# Patient Record
Sex: FEMALE | Race: WHITE | NOT HISPANIC OR LATINO | Employment: OTHER | URBAN - METROPOLITAN AREA
[De-identification: names, ages, dates, MRNs, and addresses within clinical notes are randomized per-mention and may not be internally consistent; named-entity substitution may affect disease eponyms.]

---

## 2019-01-01 ENCOUNTER — APPOINTMENT (OUTPATIENT)
Dept: GENERAL RADIOLOGY | Facility: HOSPITAL | Age: 84
End: 2019-01-01

## 2019-01-01 ENCOUNTER — HOSPITAL ENCOUNTER (EMERGENCY)
Facility: HOSPITAL | Age: 84
Discharge: HOME | End: 2019-05-09
Attending: EMERGENCY MEDICINE | Admitting: EMERGENCY MEDICINE

## 2019-01-01 ENCOUNTER — HOSPITAL ENCOUNTER (INPATIENT)
Facility: HOSPITAL | Age: 84
LOS: 3 days | Discharge: HOME HEALTH | End: 2019-10-11
Attending: SPECIALIST | Admitting: SPECIALIST

## 2019-01-01 ENCOUNTER — TELEPHONE (OUTPATIENT)
Dept: FAMILY MEDICINE CLINIC | Facility: CLINIC | Age: 84
End: 2019-01-01

## 2019-01-01 ENCOUNTER — READMISSION MANAGEMENT (OUTPATIENT)
Dept: CALL CENTER | Facility: HOSPITAL | Age: 84
End: 2019-01-01

## 2019-01-01 ENCOUNTER — HOSPITAL ENCOUNTER (EMERGENCY)
Facility: HOSPITAL | Age: 84
Discharge: PSYCHIATRIC OR SUBSTANCE USE FACILITY | End: 2019-10-08
Attending: EMERGENCY MEDICINE | Admitting: EMERGENCY MEDICINE

## 2019-01-01 ENCOUNTER — APPOINTMENT (OUTPATIENT)
Dept: CT IMAGING | Facility: HOSPITAL | Age: 84
End: 2019-01-01

## 2019-01-01 ENCOUNTER — HOSPITAL ENCOUNTER (INPATIENT)
Facility: HOSPITAL | Age: 84
LOS: 4 days | Discharge: HOME HEALTH | End: 2019-10-06
Attending: INTERNAL MEDICINE | Admitting: INTERNAL MEDICINE

## 2019-01-01 ENCOUNTER — OFFICE VISIT (OUTPATIENT)
Dept: FAMILY MEDICINE CLINIC | Facility: CLINIC | Age: 84
End: 2019-01-01

## 2019-01-01 ENCOUNTER — TELEPHONE (OUTPATIENT)
Dept: OTHER | Facility: OTHER | Age: 84
End: 2019-01-01

## 2019-01-01 ENCOUNTER — APPOINTMENT (OUTPATIENT)
Dept: ULTRASOUND IMAGING | Facility: HOSPITAL | Age: 84
End: 2019-01-01

## 2019-01-01 VITALS
SYSTOLIC BLOOD PRESSURE: 130 MMHG | TEMPERATURE: 97.6 F | OXYGEN SATURATION: 99 % | HEIGHT: 60 IN | DIASTOLIC BLOOD PRESSURE: 60 MMHG | BODY MASS INDEX: 26.93 KG/M2 | HEART RATE: 57 BPM | WEIGHT: 137.2 LBS

## 2019-01-01 VITALS
RESPIRATION RATE: 15 BRPM | HEIGHT: 60 IN | OXYGEN SATURATION: 93 % | TEMPERATURE: 98 F | WEIGHT: 130.8 LBS | BODY MASS INDEX: 25.68 KG/M2 | DIASTOLIC BLOOD PRESSURE: 57 MMHG | HEART RATE: 68 BPM | SYSTOLIC BLOOD PRESSURE: 125 MMHG

## 2019-01-01 VITALS
RESPIRATION RATE: 16 BRPM | BODY MASS INDEX: 27.6 KG/M2 | TEMPERATURE: 96.7 F | HEART RATE: 62 BPM | SYSTOLIC BLOOD PRESSURE: 118 MMHG | WEIGHT: 141.31 LBS | DIASTOLIC BLOOD PRESSURE: 62 MMHG | OXYGEN SATURATION: 97 %

## 2019-01-01 VITALS
HEART RATE: 80 BPM | TEMPERATURE: 98.1 F | WEIGHT: 141.3 LBS | OXYGEN SATURATION: 97 % | HEIGHT: 60 IN | SYSTOLIC BLOOD PRESSURE: 158 MMHG | RESPIRATION RATE: 16 BRPM | DIASTOLIC BLOOD PRESSURE: 67 MMHG | BODY MASS INDEX: 27.74 KG/M2

## 2019-01-01 VITALS
DIASTOLIC BLOOD PRESSURE: 54 MMHG | BODY MASS INDEX: 25.91 KG/M2 | TEMPERATURE: 97.5 F | OXYGEN SATURATION: 99 % | SYSTOLIC BLOOD PRESSURE: 110 MMHG | HEART RATE: 65 BPM | HEIGHT: 60 IN | WEIGHT: 132 LBS

## 2019-01-01 VITALS
DIASTOLIC BLOOD PRESSURE: 77 MMHG | BODY MASS INDEX: 28.07 KG/M2 | RESPIRATION RATE: 16 BRPM | WEIGHT: 143 LBS | OXYGEN SATURATION: 98 % | SYSTOLIC BLOOD PRESSURE: 142 MMHG | HEART RATE: 50 BPM | TEMPERATURE: 98.7 F | HEIGHT: 60 IN

## 2019-01-01 DIAGNOSIS — I50.9 ACUTE CONGESTIVE HEART FAILURE, UNSPECIFIED HEART FAILURE TYPE (HCC): ICD-10-CM

## 2019-01-01 DIAGNOSIS — D64.9 ANEMIA, UNSPECIFIED TYPE: ICD-10-CM

## 2019-01-01 DIAGNOSIS — G30.1 LATE ONSET ALZHEIMER'S DISEASE WITH BEHAVIORAL DISTURBANCE (HCC): Primary | ICD-10-CM

## 2019-01-01 DIAGNOSIS — I10 ESSENTIAL HYPERTENSION: Primary | ICD-10-CM

## 2019-01-01 DIAGNOSIS — E11.9 TYPE 2 DIABETES MELLITUS WITHOUT COMPLICATION, WITHOUT LONG-TERM CURRENT USE OF INSULIN (HCC): ICD-10-CM

## 2019-01-01 DIAGNOSIS — F02.80 LATE ONSET ALZHEIMER'S DISEASE WITHOUT BEHAVIORAL DISTURBANCE (HCC): ICD-10-CM

## 2019-01-01 DIAGNOSIS — R26.89 DECREASED MOBILITY: ICD-10-CM

## 2019-01-01 DIAGNOSIS — E78.2 MIXED HYPERLIPIDEMIA: ICD-10-CM

## 2019-01-01 DIAGNOSIS — I21.9 MYOCARDIAL INFARCTION, UNSPECIFIED MI TYPE, UNSPECIFIED ARTERY (HCC): ICD-10-CM

## 2019-01-01 DIAGNOSIS — R10.32 LLQ PAIN: Primary | ICD-10-CM

## 2019-01-01 DIAGNOSIS — F02.818 LATE ONSET ALZHEIMER'S DISEASE WITH BEHAVIORAL DISTURBANCE (HCC): Primary | ICD-10-CM

## 2019-01-01 DIAGNOSIS — G30.1 LATE ONSET ALZHEIMER'S DISEASE WITHOUT BEHAVIORAL DISTURBANCE (HCC): ICD-10-CM

## 2019-01-01 DIAGNOSIS — I50.43 ACUTE ON CHRONIC COMBINED SYSTOLIC AND DIASTOLIC CHF (CONGESTIVE HEART FAILURE) (HCC): Primary | ICD-10-CM

## 2019-01-01 LAB
ALBUMIN SERPL-MCNC: 3.6 G/DL (ref 3.5–5.2)
ALBUMIN SERPL-MCNC: 3.9 G/DL (ref 3.5–5.2)
ALBUMIN SERPL-MCNC: 3.9 G/DL (ref 3.5–5.2)
ALBUMIN SERPL-MCNC: 4.1 G/DL (ref 3.5–5.2)
ALBUMIN/GLOB SERPL: 1.2 G/DL
ALBUMIN/GLOB SERPL: 1.4 G/DL
ALBUMIN/GLOB SERPL: 1.6 G/DL
ALBUMIN/GLOB SERPL: 1.7 G/DL
ALP SERPL-CCNC: 106 U/L (ref 39–117)
ALP SERPL-CCNC: 113 U/L (ref 39–117)
ALP SERPL-CCNC: 116 U/L (ref 39–117)
ALP SERPL-CCNC: 118 U/L (ref 39–117)
ALP SERPL-CCNC: 124 U/L (ref 39–117)
ALP SERPL-CCNC: 147 U/L (ref 39–117)
ALT SERPL W P-5'-P-CCNC: 13 U/L (ref 1–33)
ALT SERPL W P-5'-P-CCNC: 14 U/L (ref 1–33)
ALT SERPL W P-5'-P-CCNC: 5 U/L (ref 1–33)
ALT SERPL W P-5'-P-CCNC: 6 U/L (ref 1–33)
ALT SERPL-CCNC: 15 U/L (ref 1–33)
ALT SERPL-CCNC: 9 U/L (ref 1–33)
AMMONIA BLD-SCNC: 48 UMOL/L (ref 11–51)
ANION GAP SERPL CALCULATED.3IONS-SCNC: 11.4 MMOL/L (ref 5–15)
ANION GAP SERPL CALCULATED.3IONS-SCNC: 12 MMOL/L (ref 5–15)
ANION GAP SERPL CALCULATED.3IONS-SCNC: 12.2 MMOL/L (ref 5–15)
ANION GAP SERPL CALCULATED.3IONS-SCNC: 13.5 MMOL/L
ANION GAP SERPL CALCULATED.3IONS-SCNC: 13.6 MMOL/L (ref 5–15)
ANION GAP SERPL CALCULATED.3IONS-SCNC: 14.1 MMOL/L (ref 5–15)
APPEARANCE FLD: ABNORMAL
APTT PPP: 29.3 SECONDS (ref 22.7–35.4)
AST SERPL-CCNC: 13 U/L (ref 1–32)
AST SERPL-CCNC: 13 U/L (ref 1–32)
AST SERPL-CCNC: 16 U/L (ref 1–32)
AST SERPL-CCNC: 18 U/L (ref 1–32)
AST SERPL-CCNC: 19 U/L (ref 1–32)
AST SERPL-CCNC: 33 U/L (ref 1–32)
B PARAPERT DNA SPEC QL NAA+PROBE: NOT DETECTED
B PERT DNA SPEC QL NAA+PROBE: NOT DETECTED
BACTERIA FLD CULT: NORMAL
BASOPHILS # BLD AUTO: 0.01 10*3/MM3 (ref 0–0.2)
BASOPHILS # BLD AUTO: 0.02 10*3/MM3 (ref 0–0.2)
BASOPHILS # BLD AUTO: 0.02 10*3/MM3 (ref 0–0.2)
BASOPHILS # BLD AUTO: 0.03 10*3/MM3 (ref 0–0.2)
BASOPHILS NFR BLD AUTO: 0.1 % (ref 0–1.5)
BASOPHILS NFR BLD AUTO: 0.3 % (ref 0–1.5)
BASOPHILS NFR BLD AUTO: 0.4 % (ref 0–1.5)
BASOPHILS NFR BLD AUTO: 0.6 % (ref 0–1.5)
BASOPHILS NFR BLD AUTO: 0.6 % (ref 0–1.5)
BILIRUB SERPL-MCNC: 0.3 MG/DL (ref 0.2–1.2)
BILIRUB SERPL-MCNC: 0.5 MG/DL (ref 0.2–1.2)
BILIRUB SERPL-MCNC: 0.5 MG/DL (ref 0.2–1.2)
BILIRUB SERPL-MCNC: 0.6 MG/DL (ref 0.2–1.2)
BILIRUB SERPL-MCNC: 0.6 MG/DL (ref 0.2–1.2)
BILIRUB SERPL-MCNC: 0.8 MG/DL (ref 0.2–1.2)
BILIRUB UR QL STRIP: NEGATIVE
BILIRUB UR QL STRIP: NEGATIVE
BUN BLD-MCNC: 16 MG/DL (ref 8–23)
BUN BLD-MCNC: 21 MG/DL (ref 8–23)
BUN BLD-MCNC: 23 MG/DL (ref 8–23)
BUN BLD-MCNC: 29 MG/DL (ref 8–23)
BUN BLD-MCNC: 34 MG/DL (ref 8–23)
BUN BLD-MCNC: 37 MG/DL (ref 8–23)
BUN SERPL-MCNC: 26 MG/DL (ref 8–23)
BUN SERPL-MCNC: 41 MG/DL (ref 8–23)
BUN/CREAT SERPL: 15 (ref 7–25)
BUN/CREAT SERPL: 15.2 (ref 7–25)
BUN/CREAT SERPL: 18.9 (ref 7–25)
BUN/CREAT SERPL: 19.3 (ref 7–25)
BUN/CREAT SERPL: 21 (ref 7–25)
BUN/CREAT SERPL: 26.2 (ref 7–25)
BUN/CREAT SERPL: 28.1 (ref 7–25)
BUN/CREAT SERPL: 33.3 (ref 7–25)
C PNEUM DNA NPH QL NAA+NON-PROBE: NOT DETECTED
CALCIUM SERPL-MCNC: 8.9 MG/DL (ref 8.6–10.5)
CALCIUM SERPL-MCNC: 9 MG/DL (ref 8.6–10.5)
CALCIUM SPEC-SCNC: 8.5 MG/DL (ref 8.6–10.5)
CALCIUM SPEC-SCNC: 8.6 MG/DL (ref 8.6–10.5)
CALCIUM SPEC-SCNC: 8.8 MG/DL (ref 8.6–10.5)
CALCIUM SPEC-SCNC: 9 MG/DL (ref 8.6–10.5)
CALCIUM SPEC-SCNC: 9.1 MG/DL (ref 8.6–10.5)
CALCIUM SPEC-SCNC: 9.2 MG/DL (ref 8.6–10.5)
CHLORIDE SERPL-SCNC: 100 MMOL/L (ref 98–107)
CHLORIDE SERPL-SCNC: 101 MMOL/L (ref 98–107)
CHLORIDE SERPL-SCNC: 103 MMOL/L (ref 98–107)
CHLORIDE SERPL-SCNC: 104 MMOL/L (ref 98–107)
CHLORIDE SERPL-SCNC: 105 MMOL/L (ref 98–107)
CHLORIDE SERPL-SCNC: 108 MMOL/L (ref 98–107)
CHOLEST SERPL-MCNC: 114 MG/DL (ref 0–200)
CHOLEST SERPL-MCNC: 172 MG/DL (ref 0–200)
CLARITY UR: CLEAR
CLARITY UR: CLEAR
CO2 SERPL-SCNC: 22.5 MMOL/L (ref 22–29)
CO2 SERPL-SCNC: 23.9 MMOL/L (ref 22–29)
CO2 SERPL-SCNC: 25.8 MMOL/L (ref 22–29)
CO2 SERPL-SCNC: 26.4 MMOL/L (ref 22–29)
CO2 SERPL-SCNC: 26.4 MMOL/L (ref 22–29)
CO2 SERPL-SCNC: 27.1 MMOL/L (ref 22–29)
CO2 SERPL-SCNC: 28.6 MMOL/L (ref 22–29)
CO2 SERPL-SCNC: 29 MMOL/L (ref 22–29)
COLOR FLD: YELLOW
COLOR UR: YELLOW
COLOR UR: YELLOW
CREAT BLD-MCNC: 1.05 MG/DL (ref 0.57–1)
CREAT BLD-MCNC: 1.11 MG/DL (ref 0.57–1)
CREAT BLD-MCNC: 1.11 MG/DL (ref 0.57–1)
CREAT BLD-MCNC: 1.19 MG/DL (ref 0.57–1)
CREAT BLD-MCNC: 1.3 MG/DL (ref 0.57–1)
CREAT BLD-MCNC: 1.38 MG/DL (ref 0.57–1)
CREAT SERPL-MCNC: 1.46 MG/DL (ref 0.57–1)
CREAT SERPL-MCNC: 1.73 MG/DL (ref 0.57–1)
CYTO UR: NORMAL
D-LACTATE SERPL-SCNC: 1.3 MMOL/L (ref 0.5–2)
DEPRECATED RDW RBC AUTO: 39.1 FL (ref 37–54)
DEPRECATED RDW RBC AUTO: 39.5 FL (ref 37–54)
DEPRECATED RDW RBC AUTO: 40 FL (ref 37–54)
DEPRECATED RDW RBC AUTO: 40.3 FL (ref 37–54)
DEPRECATED RDW RBC AUTO: 40.6 FL (ref 37–54)
DEPRECATED RDW RBC AUTO: 40.8 FL (ref 37–54)
EOSINOPHIL # BLD AUTO: 0.02 10*3/MM3 (ref 0–0.4)
EOSINOPHIL # BLD AUTO: 0.06 10*3/MM3 (ref 0–0.4)
EOSINOPHIL # BLD AUTO: 0.06 10*3/MM3 (ref 0–0.4)
EOSINOPHIL # BLD AUTO: 0.07 10*3/MM3 (ref 0–0.4)
EOSINOPHIL # BLD AUTO: 0.08 10*3/MM3 (ref 0–0.4)
EOSINOPHIL # BLD AUTO: 0.14 10*3/MM3 (ref 0–0.4)
EOSINOPHIL # BLD AUTO: 0.16 10*3/MM3 (ref 0–0.4)
EOSINOPHIL NFR BLD AUTO: 0.3 % (ref 0.3–6.2)
EOSINOPHIL NFR BLD AUTO: 0.8 % (ref 0.3–6.2)
EOSINOPHIL NFR BLD AUTO: 1 % (ref 0.3–6.2)
EOSINOPHIL NFR BLD AUTO: 1.2 % (ref 0.3–6.2)
EOSINOPHIL NFR BLD AUTO: 1.4 % (ref 0.3–6.2)
EOSINOPHIL NFR BLD AUTO: 2.3 % (ref 0.3–6.2)
EOSINOPHIL NFR BLD AUTO: 2.9 % (ref 0.3–6.2)
ERYTHROCYTE [DISTWIDTH] IN BLOOD BY AUTOMATED COUNT: 12.9 % (ref 12.3–15.4)
ERYTHROCYTE [DISTWIDTH] IN BLOOD BY AUTOMATED COUNT: 13.3 % (ref 12.3–15.4)
ERYTHROCYTE [DISTWIDTH] IN BLOOD BY AUTOMATED COUNT: 13.3 % (ref 12.3–15.4)
ERYTHROCYTE [DISTWIDTH] IN BLOOD BY AUTOMATED COUNT: 13.4 % (ref 12.3–15.4)
ERYTHROCYTE [DISTWIDTH] IN BLOOD BY AUTOMATED COUNT: 13.5 % (ref 12.3–15.4)
ERYTHROCYTE [DISTWIDTH] IN BLOOD BY AUTOMATED COUNT: 13.5 % (ref 12.3–15.4)
ERYTHROCYTE [DISTWIDTH] IN BLOOD BY AUTOMATED COUNT: 14.4 % (ref 12.3–15.4)
FLUAV H1 2009 PAND RNA NPH QL NAA+PROBE: NOT DETECTED
FLUAV H1 HA GENE NPH QL NAA+PROBE: NOT DETECTED
FLUAV H3 RNA NPH QL NAA+PROBE: NOT DETECTED
FLUAV SUBTYP SPEC NAA+PROBE: NOT DETECTED
FLUBV RNA ISLT QL NAA+PROBE: NOT DETECTED
GFR SERPL CREATININE-BSD FRML MDRD: 36 ML/MIN/1.73
GFR SERPL CREATININE-BSD FRML MDRD: 39 ML/MIN/1.73
GFR SERPL CREATININE-BSD FRML MDRD: 43 ML/MIN/1.73
GFR SERPL CREATININE-BSD FRML MDRD: 46 ML/MIN/1.73
GFR SERPL CREATININE-BSD FRML MDRD: 46 ML/MIN/1.73
GFR SERPL CREATININE-BSD FRML MDRD: 49 ML/MIN/1.73
GLOBULIN SER CALC-MCNC: 2.3 GM/DL
GLOBULIN SER CALC-MCNC: 2.5 GM/DL
GLOBULIN UR ELPH-MCNC: 2.5 GM/DL
GLOBULIN UR ELPH-MCNC: 2.5 GM/DL
GLOBULIN UR ELPH-MCNC: 2.9 GM/DL
GLOBULIN UR ELPH-MCNC: 2.9 GM/DL
GLUCOSE BLD-MCNC: 131 MG/DL (ref 65–99)
GLUCOSE BLD-MCNC: 135 MG/DL (ref 65–99)
GLUCOSE BLD-MCNC: 136 MG/DL (ref 65–99)
GLUCOSE BLD-MCNC: 137 MG/DL (ref 65–99)
GLUCOSE BLD-MCNC: 159 MG/DL (ref 65–99)
GLUCOSE BLD-MCNC: 83 MG/DL (ref 65–99)
GLUCOSE BLDC GLUCOMTR-MCNC: 100 MG/DL (ref 70–130)
GLUCOSE BLDC GLUCOMTR-MCNC: 106 MG/DL (ref 70–130)
GLUCOSE BLDC GLUCOMTR-MCNC: 111 MG/DL (ref 70–130)
GLUCOSE BLDC GLUCOMTR-MCNC: 113 MG/DL (ref 70–130)
GLUCOSE BLDC GLUCOMTR-MCNC: 126 MG/DL (ref 70–130)
GLUCOSE BLDC GLUCOMTR-MCNC: 130 MG/DL (ref 70–130)
GLUCOSE BLDC GLUCOMTR-MCNC: 139 MG/DL (ref 70–130)
GLUCOSE BLDC GLUCOMTR-MCNC: 141 MG/DL (ref 70–130)
GLUCOSE BLDC GLUCOMTR-MCNC: 141 MG/DL (ref 70–130)
GLUCOSE BLDC GLUCOMTR-MCNC: 144 MG/DL (ref 70–130)
GLUCOSE BLDC GLUCOMTR-MCNC: 146 MG/DL (ref 70–130)
GLUCOSE BLDC GLUCOMTR-MCNC: 149 MG/DL (ref 70–130)
GLUCOSE BLDC GLUCOMTR-MCNC: 154 MG/DL (ref 70–130)
GLUCOSE BLDC GLUCOMTR-MCNC: 158 MG/DL (ref 70–130)
GLUCOSE BLDC GLUCOMTR-MCNC: 172 MG/DL (ref 70–130)
GLUCOSE BLDC GLUCOMTR-MCNC: 176 MG/DL (ref 70–130)
GLUCOSE BLDC GLUCOMTR-MCNC: 184 MG/DL (ref 70–130)
GLUCOSE BLDC GLUCOMTR-MCNC: 66 MG/DL (ref 70–130)
GLUCOSE BLDC GLUCOMTR-MCNC: 81 MG/DL (ref 70–130)
GLUCOSE BLDC GLUCOMTR-MCNC: 92 MG/DL (ref 70–130)
GLUCOSE FLD-MCNC: 141 MG/DL
GLUCOSE SERPL-MCNC: 116 MG/DL (ref 65–99)
GLUCOSE SERPL-MCNC: 179 MG/DL (ref 65–99)
GLUCOSE UR STRIP-MCNC: NEGATIVE MG/DL
GLUCOSE UR STRIP-MCNC: NEGATIVE MG/DL
GRAM STN SPEC: NORMAL
HADV DNA SPEC NAA+PROBE: NOT DETECTED
HBA1C MFR BLD: 5.4 % (ref 4.8–5.6)
HBA1C MFR BLD: 6.3 % (ref 4.8–5.6)
HCOV 229E RNA SPEC QL NAA+PROBE: NOT DETECTED
HCOV HKU1 RNA SPEC QL NAA+PROBE: NOT DETECTED
HCOV NL63 RNA SPEC QL NAA+PROBE: NOT DETECTED
HCOV OC43 RNA SPEC QL NAA+PROBE: NOT DETECTED
HCT VFR BLD AUTO: 34.2 % (ref 34–46.6)
HCT VFR BLD AUTO: 34.7 % (ref 34–46.6)
HCT VFR BLD AUTO: 34.9 % (ref 34–46.6)
HCT VFR BLD AUTO: 36.2 % (ref 34–46.6)
HCT VFR BLD AUTO: 36.8 % (ref 34–46.6)
HCT VFR BLD AUTO: 37 % (ref 34–46.6)
HCT VFR BLD AUTO: 41.3 % (ref 34–46.6)
HDLC SERPL-MCNC: 39 MG/DL (ref 40–60)
HDLC SERPL-MCNC: 58 MG/DL (ref 40–60)
HGB BLD-MCNC: 10.9 G/DL (ref 12–15.9)
HGB BLD-MCNC: 11 G/DL (ref 12–15.9)
HGB BLD-MCNC: 11 G/DL (ref 12–15.9)
HGB BLD-MCNC: 11.2 G/DL (ref 12–15.9)
HGB BLD-MCNC: 11.4 G/DL (ref 12–15.9)
HGB BLD-MCNC: 11.5 G/DL (ref 12–15.9)
HGB BLD-MCNC: 13.8 G/DL (ref 12–15.9)
HGB UR QL STRIP.AUTO: NEGATIVE
HGB UR QL STRIP.AUTO: NEGATIVE
HMPV RNA NPH QL NAA+NON-PROBE: NOT DETECTED
HOLD SPECIMEN: NORMAL
HPIV1 RNA SPEC QL NAA+PROBE: NOT DETECTED
HPIV2 RNA SPEC QL NAA+PROBE: NOT DETECTED
HPIV3 RNA NPH QL NAA+PROBE: NOT DETECTED
HPIV4 P GENE NPH QL NAA+PROBE: NOT DETECTED
IMM GRANULOCYTES # BLD AUTO: 0.01 10*3/MM3 (ref 0–0.05)
IMM GRANULOCYTES # BLD AUTO: 0.02 10*3/MM3 (ref 0–0.05)
IMM GRANULOCYTES # BLD AUTO: 0.04 10*3/MM3 (ref 0–0.05)
IMM GRANULOCYTES NFR BLD AUTO: 0.2 % (ref 0–0.5)
IMM GRANULOCYTES NFR BLD AUTO: 0.3 % (ref 0–0.5)
IMM GRANULOCYTES NFR BLD AUTO: 0.3 % (ref 0–0.5)
IMM GRANULOCYTES NFR BLD AUTO: 0.4 % (ref 0–0.5)
IMM GRANULOCYTES NFR BLD AUTO: 0.5 % (ref 0–0.5)
INR PPP: 1.06 (ref 0.9–1.1)
INR PPP: 1.13 (ref 0.9–1.1)
KETONES UR QL STRIP: NEGATIVE
KETONES UR QL STRIP: NEGATIVE
LAB AP CASE REPORT: NORMAL
LDH FLD-CCNC: 88 U/L
LDLC SERPL CALC-MCNC: 63 MG/DL (ref 0–100)
LDLC SERPL CALC-MCNC: 89 MG/DL (ref 0–100)
LDLC/HDLC SERPL: 1.63 {RATIO}
LEUKOCYTE ESTERASE UR QL STRIP.AUTO: NEGATIVE
LEUKOCYTE ESTERASE UR QL STRIP.AUTO: NEGATIVE
LIPASE SERPL-CCNC: 20 U/L (ref 13–60)
LYMPHOCYTES # BLD AUTO: 0.97 10*3/MM3 (ref 0.7–3.1)
LYMPHOCYTES # BLD AUTO: 1.05 10*3/MM3 (ref 0.7–3.1)
LYMPHOCYTES # BLD AUTO: 1.15 10*3/MM3 (ref 0.7–3.1)
LYMPHOCYTES # BLD AUTO: 1.18 10*3/MM3 (ref 0.7–3.1)
LYMPHOCYTES # BLD AUTO: 1.25 10*3/MM3 (ref 0.7–3.1)
LYMPHOCYTES # BLD AUTO: 1.31 10*3/MM3 (ref 0.7–3.1)
LYMPHOCYTES # BLD AUTO: 1.59 10*3/MM3 (ref 0.7–3.1)
LYMPHOCYTES NFR BLD AUTO: 13.6 % (ref 19.6–45.3)
LYMPHOCYTES NFR BLD AUTO: 15.7 % (ref 19.6–45.3)
LYMPHOCYTES NFR BLD AUTO: 15.7 % (ref 19.6–45.3)
LYMPHOCYTES NFR BLD AUTO: 22.7 % (ref 19.6–45.3)
LYMPHOCYTES NFR BLD AUTO: 23.5 % (ref 19.6–45.3)
LYMPHOCYTES NFR BLD AUTO: 24.8 % (ref 19.6–45.3)
LYMPHOCYTES NFR BLD AUTO: 26.9 % (ref 19.6–45.3)
LYMPHOCYTES NFR FLD MANUAL: 41 %
M PNEUMO IGG SER IA-ACNC: NOT DETECTED
MCH RBC QN AUTO: 25.8 PG (ref 26.6–33)
MCH RBC QN AUTO: 25.9 PG (ref 26.6–33)
MCH RBC QN AUTO: 26 PG (ref 26.6–33)
MCH RBC QN AUTO: 26.3 PG (ref 26.6–33)
MCH RBC QN AUTO: 26.7 PG (ref 26.6–33)
MCH RBC QN AUTO: 27.2 PG (ref 26.6–33)
MCH RBC QN AUTO: 28.6 PG (ref 26.6–33)
MCHC RBC AUTO-ENTMCNC: 29.7 G/DL (ref 31.5–35.7)
MCHC RBC AUTO-ENTMCNC: 31.3 G/DL (ref 31.5–35.7)
MCHC RBC AUTO-ENTMCNC: 31.5 G/DL (ref 31.5–35.7)
MCHC RBC AUTO-ENTMCNC: 31.7 G/DL (ref 31.5–35.7)
MCHC RBC AUTO-ENTMCNC: 31.9 G/DL (ref 31.5–35.7)
MCHC RBC AUTO-ENTMCNC: 32.1 G/DL (ref 31.5–35.7)
MCHC RBC AUTO-ENTMCNC: 33.4 G/DL (ref 31.5–35.7)
MCV RBC AUTO: 81.6 FL (ref 79–97)
MCV RBC AUTO: 81.9 FL (ref 79–97)
MCV RBC AUTO: 82.1 FL (ref 79–97)
MCV RBC AUTO: 82.5 FL (ref 79–97)
MCV RBC AUTO: 84.2 FL (ref 79–97)
MCV RBC AUTO: 85.7 FL (ref 79–97)
MCV RBC AUTO: 91.6 FL (ref 79–97)
MONOCYTES # BLD AUTO: 0.21 10*3/MM3 (ref 0.1–0.9)
MONOCYTES # BLD AUTO: 0.33 10*3/MM3 (ref 0.1–0.9)
MONOCYTES # BLD AUTO: 0.35 10*3/MM3 (ref 0.1–0.9)
MONOCYTES # BLD AUTO: 0.4 10*3/MM3 (ref 0.1–0.9)
MONOCYTES # BLD AUTO: 0.41 10*3/MM3 (ref 0.1–0.9)
MONOCYTES # BLD AUTO: 0.46 10*3/MM3 (ref 0.1–0.9)
MONOCYTES # BLD AUTO: 0.47 10*3/MM3 (ref 0.1–0.9)
MONOCYTES NFR BLD AUTO: 2.8 % (ref 5–12)
MONOCYTES NFR BLD AUTO: 5.7 % (ref 5–12)
MONOCYTES NFR BLD AUTO: 6 % (ref 5–12)
MONOCYTES NFR BLD AUTO: 6.7 % (ref 5–12)
MONOCYTES NFR BLD AUTO: 6.8 % (ref 5–12)
MONOCYTES NFR BLD AUTO: 7.9 % (ref 5–12)
MONOCYTES NFR BLD AUTO: 8.4 % (ref 5–12)
MONOCYTES NFR FLD: 4 %
MONOS+MACROS NFR FLD: 52 %
NEUTROPHILS # BLD AUTO: 3.04 10*3/MM3 (ref 1.7–7)
NEUTROPHILS # BLD AUTO: 3.23 10*3/MM3 (ref 1.7–7)
NEUTROPHILS # BLD AUTO: 3.31 10*3/MM3 (ref 1.7–7)
NEUTROPHILS # BLD AUTO: 4.74 10*3/MM3 (ref 1.7–7)
NEUTROPHILS # BLD AUTO: 4.82 10*3/MM3 (ref 1.7–7)
NEUTROPHILS # BLD AUTO: 6 10*3/MM3 (ref 1.7–7)
NEUTROPHILS # BLD AUTO: 6.08 10*3/MM3 (ref 1.7–7)
NEUTROPHILS NFR BLD AUTO: 62.4 % (ref 42.7–76)
NEUTROPHILS NFR BLD AUTO: 65.5 % (ref 42.7–76)
NEUTROPHILS NFR BLD AUTO: 65.9 % (ref 42.7–76)
NEUTROPHILS NFR BLD AUTO: 67.6 % (ref 42.7–76)
NEUTROPHILS NFR BLD AUTO: 77.8 % (ref 42.7–76)
NEUTROPHILS NFR BLD AUTO: 78.7 % (ref 42.7–76)
NEUTROPHILS NFR BLD AUTO: 80.1 % (ref 42.7–76)
NEUTROPHILS NFR FLD MANUAL: 3 %
NITRITE UR QL STRIP: NEGATIVE
NITRITE UR QL STRIP: NEGATIVE
NRBC BLD AUTO-RTO: 0 /100 WBC (ref 0–0.2)
NRBC BLD AUTO-RTO: 0.1 /100 WBC (ref 0–0.2)
NRBC BLD AUTO-RTO: 0.2 /100 WBC (ref 0–0.2)
NT-PROBNP SERPL-MCNC: ABNORMAL PG/ML (ref 5–1800)
PATH REPORT.FINAL DX SPEC: NORMAL
PATH REPORT.GROSS SPEC: NORMAL
PH FLD: 7.69 [PH]
PH UR STRIP.AUTO: 6.5 [PH] (ref 5–8)
PH UR STRIP.AUTO: 7 [PH] (ref 5–8)
PLATELET # BLD AUTO: 181 10*3/MM3 (ref 140–450)
PLATELET # BLD AUTO: 186 10*3/MM3 (ref 140–450)
PLATELET # BLD AUTO: 211 10*3/MM3 (ref 140–450)
PLATELET # BLD AUTO: 211 10*3/MM3 (ref 140–450)
PLATELET # BLD AUTO: 217 10*3/MM3 (ref 140–450)
PLATELET # BLD AUTO: 223 10*3/MM3 (ref 140–450)
PLATELET # BLD AUTO: 224 10*3/MM3 (ref 140–450)
PMV BLD AUTO: 10.1 FL (ref 6–12)
PMV BLD AUTO: 10.4 FL (ref 6–12)
PMV BLD AUTO: 10.5 FL (ref 6–12)
PMV BLD AUTO: 9.5 FL (ref 6–12)
PMV BLD AUTO: 9.7 FL (ref 6–12)
PMV BLD AUTO: 9.7 FL (ref 6–12)
POTASSIUM BLD-SCNC: 3.7 MMOL/L (ref 3.5–5.2)
POTASSIUM BLD-SCNC: 3.9 MMOL/L (ref 3.5–5.2)
POTASSIUM BLD-SCNC: 3.9 MMOL/L (ref 3.5–5.2)
POTASSIUM BLD-SCNC: 4.1 MMOL/L (ref 3.5–5.2)
POTASSIUM BLD-SCNC: 4.6 MMOL/L (ref 3.5–5.2)
POTASSIUM BLD-SCNC: 5.4 MMOL/L (ref 3.5–5.2)
POTASSIUM SERPL-SCNC: 4.2 MMOL/L (ref 3.5–5.2)
POTASSIUM SERPL-SCNC: 4.6 MMOL/L (ref 3.5–5.2)
PROCALCITONIN SERPL-MCNC: 0.06 NG/ML (ref 0.1–0.25)
PROCALCITONIN SERPL-MCNC: 0.07 NG/ML (ref 0.1–0.25)
PROT FLD-MCNC: 2.5 G/DL
PROT SERPL-MCNC: 6.2 G/DL (ref 6–8.5)
PROT SERPL-MCNC: 6.4 G/DL (ref 6–8.5)
PROT SERPL-MCNC: 6.5 G/DL (ref 6–8.5)
PROT SERPL-MCNC: 6.6 G/DL (ref 6–8.5)
PROT SERPL-MCNC: 6.6 G/DL (ref 6–8.5)
PROT SERPL-MCNC: 7 G/DL (ref 6–8.5)
PROT UR QL STRIP: NEGATIVE
PROT UR QL STRIP: NEGATIVE
PROTHROMBIN TIME: 13.5 SECONDS (ref 11.7–14.2)
PROTHROMBIN TIME: 14.2 SECONDS (ref 11.7–14.2)
RBC # BLD AUTO: 4.04 10*6/MM3 (ref 3.77–5.28)
RBC # BLD AUTO: 4.12 10*6/MM3 (ref 3.77–5.28)
RBC # BLD AUTO: 4.19 10*6/MM3 (ref 3.77–5.28)
RBC # BLD AUTO: 4.26 10*6/MM3 (ref 3.77–5.28)
RBC # BLD AUTO: 4.41 10*6/MM3 (ref 3.77–5.28)
RBC # BLD AUTO: 4.46 10*6/MM3 (ref 3.77–5.28)
RBC # BLD AUTO: 4.82 10*6/MM3 (ref 3.77–5.28)
RBC # FLD AUTO: 131 /MM3
RHINOVIRUS RNA SPEC NAA+PROBE: NOT DETECTED
RSV RNA NPH QL NAA+NON-PROBE: NOT DETECTED
SODIUM BLD-SCNC: 138 MMOL/L (ref 136–145)
SODIUM BLD-SCNC: 139 MMOL/L (ref 136–145)
SODIUM BLD-SCNC: 141 MMOL/L (ref 136–145)
SODIUM BLD-SCNC: 142 MMOL/L (ref 136–145)
SODIUM BLD-SCNC: 142 MMOL/L (ref 136–145)
SODIUM BLD-SCNC: 145 MMOL/L (ref 136–145)
SODIUM SERPL-SCNC: 144 MMOL/L (ref 136–145)
SODIUM SERPL-SCNC: 147 MMOL/L (ref 136–145)
SP GR UR STRIP: 1.01 (ref 1–1.03)
SP GR UR STRIP: 1.02 (ref 1–1.03)
T4 FREE SERPL-MCNC: 1.53 NG/DL (ref 0.93–1.7)
TRIGL SERPL-MCNC: 123 MG/DL (ref 0–150)
TRIGL SERPL-MCNC: 58 MG/DL (ref 0–150)
TROPONIN T SERPL-MCNC: <0.01 NG/ML (ref 0–0.03)
TSH SERPL DL<=0.05 MIU/L-ACNC: 1.2 UIU/ML (ref 0.27–4.2)
UROBILINOGEN UR QL STRIP: NORMAL
UROBILINOGEN UR QL STRIP: NORMAL
VLDLC SERPL CALC-MCNC: 24.6 MG/DL
VLDLC SERPL-MCNC: 11.6 MG/DL (ref 5–40)
WBC # BLD AUTO: 7.72 10*3/MM3 (ref 3.4–10.8)
WBC # FLD AUTO: 173 /MM3
WBC NRBC COR # BLD: 4.87 10*3/MM3 (ref 3.4–10.8)
WBC NRBC COR # BLD: 4.9 10*3/MM3 (ref 3.4–10.8)
WBC NRBC COR # BLD: 5.05 10*3/MM3 (ref 3.4–10.8)
WBC NRBC COR # BLD: 6.19 10*3/MM3 (ref 3.4–10.8)
WBC NRBC COR # BLD: 7.01 10*3/MM3 (ref 3.4–10.8)
WBC NRBC COR # BLD: 7.5 10*3/MM3 (ref 3.4–10.8)
WHOLE BLOOD HOLD SPECIMEN: NORMAL

## 2019-01-01 PROCEDURE — 81003 URINALYSIS AUTO W/O SCOPE: CPT | Performed by: NURSE PRACTITIONER

## 2019-01-01 PROCEDURE — 99285 EMERGENCY DEPT VISIT HI MDM: CPT

## 2019-01-01 PROCEDURE — 71046 X-RAY EXAM CHEST 2 VIEWS: CPT

## 2019-01-01 PROCEDURE — 25010000002 FUROSEMIDE PER 20 MG: Performed by: NURSE PRACTITIONER

## 2019-01-01 PROCEDURE — 87205 SMEAR GRAM STAIN: CPT | Performed by: INTERNAL MEDICINE

## 2019-01-01 PROCEDURE — 83036 HEMOGLOBIN GLYCOSYLATED A1C: CPT | Performed by: INTERNAL MEDICINE

## 2019-01-01 PROCEDURE — 63710000001 INSULIN LISPRO (HUMAN) PER 5 UNITS: Performed by: INTERNAL MEDICINE

## 2019-01-01 PROCEDURE — 85025 COMPLETE CBC W/AUTO DIFF WBC: CPT | Performed by: NURSE PRACTITIONER

## 2019-01-01 PROCEDURE — 25010000002 IOPAMIDOL 61 % SOLUTION: Performed by: EMERGENCY MEDICINE

## 2019-01-01 PROCEDURE — 25010000002 ENOXAPARIN PER 10 MG: Performed by: INTERNAL MEDICINE

## 2019-01-01 PROCEDURE — 97162 PT EVAL MOD COMPLEX 30 MIN: CPT

## 2019-01-01 PROCEDURE — 80053 COMPREHEN METABOLIC PANEL: CPT | Performed by: INTERNAL MEDICINE

## 2019-01-01 PROCEDURE — 25010000002 FUROSEMIDE PER 20 MG: Performed by: INTERNAL MEDICINE

## 2019-01-01 PROCEDURE — 82962 GLUCOSE BLOOD TEST: CPT

## 2019-01-01 PROCEDURE — 82945 GLUCOSE OTHER FLUID: CPT | Performed by: INTERNAL MEDICINE

## 2019-01-01 PROCEDURE — 80061 LIPID PANEL: CPT | Performed by: SPECIALIST

## 2019-01-01 PROCEDURE — 84145 PROCALCITONIN (PCT): CPT | Performed by: INTERNAL MEDICINE

## 2019-01-01 PROCEDURE — 80053 COMPREHEN METABOLIC PANEL: CPT | Performed by: EMERGENCY MEDICINE

## 2019-01-01 PROCEDURE — 90791 PSYCH DIAGNOSTIC EVALUATION: CPT | Performed by: SOCIAL WORKER

## 2019-01-01 PROCEDURE — 87070 CULTURE OTHR SPECIMN AEROBIC: CPT | Performed by: INTERNAL MEDICINE

## 2019-01-01 PROCEDURE — 36415 COLL VENOUS BLD VENIPUNCTURE: CPT | Performed by: INTERNAL MEDICINE

## 2019-01-01 PROCEDURE — 71250 CT THORAX DX C-: CPT

## 2019-01-01 PROCEDURE — 93010 ELECTROCARDIOGRAM REPORT: CPT | Performed by: INTERNAL MEDICINE

## 2019-01-01 PROCEDURE — 83690 ASSAY OF LIPASE: CPT | Performed by: EMERGENCY MEDICINE

## 2019-01-01 PROCEDURE — 85025 COMPLETE CBC W/AUTO DIFF WBC: CPT | Performed by: EMERGENCY MEDICINE

## 2019-01-01 PROCEDURE — 85730 THROMBOPLASTIN TIME PARTIAL: CPT | Performed by: INTERNAL MEDICINE

## 2019-01-01 PROCEDURE — 87015 SPECIMEN INFECT AGNT CONCNTJ: CPT | Performed by: INTERNAL MEDICINE

## 2019-01-01 PROCEDURE — 99214 OFFICE O/P EST MOD 30 MIN: CPT | Performed by: FAMILY MEDICINE

## 2019-01-01 PROCEDURE — 96374 THER/PROPH/DIAG INJ IV PUSH: CPT

## 2019-01-01 PROCEDURE — 25010000002 ZIPRASIDONE MESYLATE PER 10 MG: Performed by: NURSE PRACTITIONER

## 2019-01-01 PROCEDURE — 80048 BASIC METABOLIC PNL TOTAL CA: CPT | Performed by: NURSE PRACTITIONER

## 2019-01-01 PROCEDURE — 25010000002 ONDANSETRON PER 1 MG: Performed by: EMERGENCY MEDICINE

## 2019-01-01 PROCEDURE — 96375 TX/PRO/DX INJ NEW DRUG ADDON: CPT

## 2019-01-01 PROCEDURE — 96372 THER/PROPH/DIAG INJ SC/IM: CPT

## 2019-01-01 PROCEDURE — 83615 LACTATE (LD) (LDH) ENZYME: CPT | Performed by: INTERNAL MEDICINE

## 2019-01-01 PROCEDURE — 97110 THERAPEUTIC EXERCISES: CPT

## 2019-01-01 PROCEDURE — 0100U HC BIOFIRE FILMARRAY RESP PANEL 2: CPT | Performed by: INTERNAL MEDICINE

## 2019-01-01 PROCEDURE — 93005 ELECTROCARDIOGRAM TRACING: CPT | Performed by: NURSE PRACTITIONER

## 2019-01-01 PROCEDURE — G0378 HOSPITAL OBSERVATION PER HR: HCPCS

## 2019-01-01 PROCEDURE — 96361 HYDRATE IV INFUSION ADD-ON: CPT

## 2019-01-01 PROCEDURE — 70450 CT HEAD/BRAIN W/O DYE: CPT

## 2019-01-01 PROCEDURE — 89051 BODY FLUID CELL COUNT: CPT | Performed by: INTERNAL MEDICINE

## 2019-01-01 PROCEDURE — 25010000003 LIDOCAINE 1 % SOLUTION: Performed by: RADIOLOGY

## 2019-01-01 PROCEDURE — 84484 ASSAY OF TROPONIN QUANT: CPT | Performed by: NURSE PRACTITIONER

## 2019-01-01 PROCEDURE — 85610 PROTHROMBIN TIME: CPT | Performed by: INTERNAL MEDICINE

## 2019-01-01 PROCEDURE — 82140 ASSAY OF AMMONIA: CPT | Performed by: EMERGENCY MEDICINE

## 2019-01-01 PROCEDURE — 83605 ASSAY OF LACTIC ACID: CPT | Performed by: INTERNAL MEDICINE

## 2019-01-01 PROCEDURE — 84157 ASSAY OF PROTEIN OTHER: CPT | Performed by: INTERNAL MEDICINE

## 2019-01-01 PROCEDURE — 25010000002 MORPHINE PER 10 MG: Performed by: EMERGENCY MEDICINE

## 2019-01-01 PROCEDURE — 25010000002 LORAZEPAM PER 2 MG: Performed by: INTERNAL MEDICINE

## 2019-01-01 PROCEDURE — P9612 CATHETERIZE FOR URINE SPEC: HCPCS

## 2019-01-01 PROCEDURE — 0W993ZZ DRAINAGE OF RIGHT PLEURAL CAVITY, PERCUTANEOUS APPROACH: ICD-10-PCS | Performed by: INTERNAL MEDICINE

## 2019-01-01 PROCEDURE — 83880 ASSAY OF NATRIURETIC PEPTIDE: CPT | Performed by: NURSE PRACTITIONER

## 2019-01-01 PROCEDURE — 99284 EMERGENCY DEPT VISIT MOD MDM: CPT

## 2019-01-01 PROCEDURE — 84443 ASSAY THYROID STIM HORMONE: CPT | Performed by: SPECIALIST

## 2019-01-01 PROCEDURE — 76942 ECHO GUIDE FOR BIOPSY: CPT

## 2019-01-01 PROCEDURE — 84439 ASSAY OF FREE THYROXINE: CPT | Performed by: SPECIALIST

## 2019-01-01 PROCEDURE — 85610 PROTHROMBIN TIME: CPT | Performed by: EMERGENCY MEDICINE

## 2019-01-01 PROCEDURE — 85025 COMPLETE CBC W/AUTO DIFF WBC: CPT | Performed by: INTERNAL MEDICINE

## 2019-01-01 PROCEDURE — 71045 X-RAY EXAM CHEST 1 VIEW: CPT

## 2019-01-01 PROCEDURE — 83986 ASSAY PH BODY FLUID NOS: CPT | Performed by: INTERNAL MEDICINE

## 2019-01-01 PROCEDURE — 88112 CYTOPATH CELL ENHANCE TECH: CPT | Performed by: INTERNAL MEDICINE

## 2019-01-01 PROCEDURE — 74177 CT ABD & PELVIS W/CONTRAST: CPT

## 2019-01-01 PROCEDURE — 80053 COMPREHEN METABOLIC PANEL: CPT | Performed by: NURSE PRACTITIONER

## 2019-01-01 RX ORDER — TIMOLOL MALEATE 5 MG/ML
1 SOLUTION/ DROPS OPHTHALMIC 2 TIMES DAILY
COMMUNITY
End: 2019-01-01

## 2019-01-01 RX ORDER — METOPROLOL SUCCINATE 25 MG/1
12.5 TABLET, EXTENDED RELEASE ORAL DAILY
Status: DISCONTINUED | OUTPATIENT
Start: 2019-01-01 | End: 2019-01-01 | Stop reason: HOSPADM

## 2019-01-01 RX ORDER — ASPIRIN 81 MG/1
81 TABLET ORAL DAILY
Status: DISCONTINUED | OUTPATIENT
Start: 2019-01-01 | End: 2019-01-01 | Stop reason: HOSPADM

## 2019-01-01 RX ORDER — SODIUM CHLORIDE 9 MG/ML
125 INJECTION, SOLUTION INTRAVENOUS CONTINUOUS
Status: DISCONTINUED | OUTPATIENT
Start: 2019-01-01 | End: 2019-01-01 | Stop reason: HOSPADM

## 2019-01-01 RX ORDER — FUROSEMIDE 40 MG/1
40 TABLET ORAL DAILY
Qty: 30 TABLET | Refills: 0 | Status: SHIPPED | OUTPATIENT
Start: 2019-01-01

## 2019-01-01 RX ORDER — FERROUS SULFATE 325(65) MG
325 TABLET ORAL
Qty: 30 TABLET | Refills: 0 | Status: SHIPPED | OUTPATIENT
Start: 2019-01-01 | End: 2019-01-01 | Stop reason: SDUPTHER

## 2019-01-01 RX ORDER — SODIUM CHLORIDE 0.9 % (FLUSH) 0.9 %
10 SYRINGE (ML) INJECTION AS NEEDED
Status: DISCONTINUED | OUTPATIENT
Start: 2019-01-01 | End: 2019-01-01 | Stop reason: HOSPADM

## 2019-01-01 RX ORDER — FERROUS SULFATE 325(65) MG
325 TABLET ORAL
Status: DISCONTINUED | OUTPATIENT
Start: 2019-01-01 | End: 2019-01-01 | Stop reason: HOSPADM

## 2019-01-01 RX ORDER — ONDANSETRON 2 MG/ML
4 INJECTION INTRAMUSCULAR; INTRAVENOUS EVERY 6 HOURS PRN
Status: DISCONTINUED | OUTPATIENT
Start: 2019-01-01 | End: 2019-01-01 | Stop reason: HOSPADM

## 2019-01-01 RX ORDER — QUINAPRIL 20 MG/1
40 TABLET ORAL DAILY
COMMUNITY
End: 2019-01-01

## 2019-01-01 RX ORDER — MIRTAZAPINE 15 MG/1
7.5 TABLET, FILM COATED ORAL NIGHTLY
Status: DISCONTINUED | OUTPATIENT
Start: 2019-01-01 | End: 2019-01-01 | Stop reason: HOSPADM

## 2019-01-01 RX ORDER — FUROSEMIDE 20 MG/1
20 TABLET ORAL DAILY
Status: ON HOLD
Start: 2019-01-01 | End: 2019-01-01 | Stop reason: SDUPTHER

## 2019-01-01 RX ORDER — MIRTAZAPINE 7.5 MG/1
7.5 TABLET, FILM COATED ORAL NIGHTLY
Refills: 0 | COMMUNITY
Start: 2019-01-01 | End: 2019-01-01 | Stop reason: HOSPADM

## 2019-01-01 RX ORDER — METOPROLOL SUCCINATE 100 MG/1
1 TABLET, EXTENDED RELEASE ORAL DAILY
Refills: 0 | COMMUNITY
Start: 2019-01-01 | End: 2019-01-01 | Stop reason: SDUPTHER

## 2019-01-01 RX ORDER — OLANZAPINE 5 MG/1
5 TABLET ORAL ONCE
Status: COMPLETED | OUTPATIENT
Start: 2019-01-01 | End: 2019-01-01

## 2019-01-01 RX ORDER — ACETAMINOPHEN 325 MG/1
650 TABLET ORAL EVERY 4 HOURS PRN
Status: DISCONTINUED | OUTPATIENT
Start: 2019-01-01 | End: 2019-01-01 | Stop reason: HOSPADM

## 2019-01-01 RX ORDER — MIRTAZAPINE 15 MG/1
15 TABLET, FILM COATED ORAL NIGHTLY
COMMUNITY
End: 2019-01-01 | Stop reason: SDUPTHER

## 2019-01-01 RX ORDER — PANTOPRAZOLE SODIUM 40 MG/1
40 TABLET, DELAYED RELEASE ORAL DAILY
Refills: 0 | COMMUNITY
Start: 2019-01-01 | End: 2019-01-01

## 2019-01-01 RX ORDER — SODIUM CHLORIDE 0.9 % (FLUSH) 0.9 %
10 SYRINGE (ML) INJECTION EVERY 12 HOURS SCHEDULED
Status: DISCONTINUED | OUTPATIENT
Start: 2019-01-01 | End: 2019-01-01 | Stop reason: HOSPADM

## 2019-01-01 RX ORDER — ARIPIPRAZOLE 2 MG/1
2 TABLET ORAL DAILY
Status: DISCONTINUED | OUTPATIENT
Start: 2019-01-01 | End: 2019-01-01 | Stop reason: HOSPADM

## 2019-01-01 RX ORDER — ONDANSETRON 2 MG/ML
4 INJECTION INTRAMUSCULAR; INTRAVENOUS ONCE
Status: COMPLETED | OUTPATIENT
Start: 2019-01-01 | End: 2019-01-01

## 2019-01-01 RX ORDER — CHOLECALCIFEROL (VITAMIN D3) 125 MCG
5 CAPSULE ORAL NIGHTLY
Status: DISCONTINUED | OUTPATIENT
Start: 2019-01-01 | End: 2019-01-01 | Stop reason: HOSPADM

## 2019-01-01 RX ORDER — POTASSIUM CHLORIDE 1.5 G/1.77G
20 POWDER, FOR SOLUTION ORAL DAILY
Status: DISCONTINUED | OUTPATIENT
Start: 2019-01-01 | End: 2019-01-01

## 2019-01-01 RX ORDER — ATORVASTATIN CALCIUM 10 MG/1
10 TABLET, FILM COATED ORAL DAILY
Status: DISCONTINUED | OUTPATIENT
Start: 2019-01-01 | End: 2019-01-01 | Stop reason: SDUPTHER

## 2019-01-01 RX ORDER — TRAVOPROST OPHTHALMIC SOLUTION 0.04 MG/ML
1 SOLUTION OPHTHALMIC EVERY EVENING
COMMUNITY
End: 2019-01-01

## 2019-01-01 RX ORDER — MORPHINE SULFATE 2 MG/ML
4 INJECTION, SOLUTION INTRAMUSCULAR; INTRAVENOUS ONCE
Status: COMPLETED | OUTPATIENT
Start: 2019-01-01 | End: 2019-01-01

## 2019-01-01 RX ORDER — ASPIRIN 81 MG/1
81 TABLET ORAL DAILY
COMMUNITY

## 2019-01-01 RX ORDER — FUROSEMIDE 10 MG/ML
40 INJECTION INTRAMUSCULAR; INTRAVENOUS
Status: DISCONTINUED | OUTPATIENT
Start: 2019-01-01 | End: 2019-01-01

## 2019-01-01 RX ORDER — OLANZAPINE 10 MG/1
10 TABLET ORAL NIGHTLY PRN
Status: DISCONTINUED | OUTPATIENT
Start: 2019-01-01 | End: 2019-01-01

## 2019-01-01 RX ORDER — SIMVASTATIN 20 MG
20 TABLET ORAL NIGHTLY
COMMUNITY

## 2019-01-01 RX ORDER — NITROGLYCERIN 0.4 MG/1
0.4 TABLET SUBLINGUAL
Status: DISCONTINUED | OUTPATIENT
Start: 2019-01-01 | End: 2019-01-01 | Stop reason: HOSPADM

## 2019-01-01 RX ORDER — POTASSIUM CHLORIDE 750 MG/1
20 CAPSULE, EXTENDED RELEASE ORAL DAILY
Status: DISCONTINUED | OUTPATIENT
Start: 2019-01-01 | End: 2019-01-01 | Stop reason: HOSPADM

## 2019-01-01 RX ORDER — ARIPIPRAZOLE 2 MG/1
2 TABLET ORAL DAILY
Qty: 30 TABLET | Refills: 0 | Status: SHIPPED | OUTPATIENT
Start: 2019-01-01

## 2019-01-01 RX ORDER — HYDROCHLOROTHIAZIDE 25 MG/1
25 TABLET ORAL DAILY
Refills: 0 | COMMUNITY
Start: 2019-01-01 | End: 2019-01-01

## 2019-01-01 RX ORDER — NICOTINE POLACRILEX 4 MG
15 LOZENGE BUCCAL
Status: DISCONTINUED | OUTPATIENT
Start: 2019-01-01 | End: 2019-01-01 | Stop reason: HOSPADM

## 2019-01-01 RX ORDER — METOPROLOL SUCCINATE 25 MG/1
25 TABLET, EXTENDED RELEASE ORAL DAILY
Qty: 90 TABLET | Refills: 3
Start: 2019-01-01 | End: 2019-01-01

## 2019-01-01 RX ORDER — DEXTROSE MONOHYDRATE 25 G/50ML
25 INJECTION, SOLUTION INTRAVENOUS
Status: DISCONTINUED | OUTPATIENT
Start: 2019-01-01 | End: 2019-01-01 | Stop reason: HOSPADM

## 2019-01-01 RX ORDER — FUROSEMIDE 20 MG/1
20 TABLET ORAL 2 TIMES DAILY
COMMUNITY
End: 2019-01-01

## 2019-01-01 RX ORDER — FUROSEMIDE 10 MG/ML
40 INJECTION INTRAMUSCULAR; INTRAVENOUS ONCE
Status: COMPLETED | OUTPATIENT
Start: 2019-01-01 | End: 2019-01-01

## 2019-01-01 RX ORDER — AMLODIPINE BESYLATE 10 MG/1
10 TABLET ORAL DAILY
Refills: 0 | COMMUNITY
Start: 2019-01-01 | End: 2019-01-01

## 2019-01-01 RX ORDER — FERROUS SULFATE 325(65) MG
TABLET ORAL
Qty: 30 TABLET | Refills: 0 | Status: SHIPPED | OUTPATIENT
Start: 2019-01-01

## 2019-01-01 RX ORDER — LISINOPRIL 10 MG/1
10 TABLET ORAL DAILY
Qty: 90 TABLET | Refills: 3
Start: 2019-01-01 | End: 2019-01-01

## 2019-01-01 RX ORDER — ACETAMINOPHEN 650 MG/1
650 SUPPOSITORY RECTAL EVERY 4 HOURS PRN
Status: DISCONTINUED | OUTPATIENT
Start: 2019-01-01 | End: 2019-01-01 | Stop reason: HOSPADM

## 2019-01-01 RX ORDER — CHOLECALCIFEROL (VITAMIN D3) 125 MCG
10 CAPSULE ORAL NIGHTLY PRN
COMMUNITY

## 2019-01-01 RX ORDER — METOPROLOL SUCCINATE 25 MG/1
12.5 TABLET, EXTENDED RELEASE ORAL DAILY
COMMUNITY

## 2019-01-01 RX ORDER — ARIPIPRAZOLE 5 MG/1
5 TABLET ORAL NIGHTLY
Status: DISCONTINUED | OUTPATIENT
Start: 2019-01-01 | End: 2019-01-01 | Stop reason: HOSPADM

## 2019-01-01 RX ORDER — MIDODRINE HYDROCHLORIDE 10 MG/1
10 TABLET ORAL 3 TIMES DAILY
COMMUNITY
End: 2019-01-01 | Stop reason: HOSPADM

## 2019-01-01 RX ORDER — ACETAMINOPHEN 160 MG/5ML
650 SOLUTION ORAL EVERY 4 HOURS PRN
Status: DISCONTINUED | OUTPATIENT
Start: 2019-01-01 | End: 2019-01-01 | Stop reason: HOSPADM

## 2019-01-01 RX ORDER — ATORVASTATIN CALCIUM 10 MG/1
10 TABLET, FILM COATED ORAL DAILY
Status: DISCONTINUED | OUTPATIENT
Start: 2019-01-01 | End: 2019-01-01 | Stop reason: HOSPADM

## 2019-01-01 RX ORDER — LOPERAMIDE HYDROCHLORIDE 2 MG/1
2 CAPSULE ORAL
Status: DISCONTINUED | OUTPATIENT
Start: 2019-01-01 | End: 2019-01-01 | Stop reason: HOSPADM

## 2019-01-01 RX ORDER — OLANZAPINE 10 MG/1
10 INJECTION, POWDER, LYOPHILIZED, FOR SOLUTION INTRAMUSCULAR EVERY 8 HOURS PRN
Status: DISCONTINUED | OUTPATIENT
Start: 2019-01-01 | End: 2019-01-01 | Stop reason: HOSPADM

## 2019-01-01 RX ORDER — FUROSEMIDE 40 MG/1
40 TABLET ORAL DAILY
Status: DISCONTINUED | OUTPATIENT
Start: 2019-01-01 | End: 2019-01-01 | Stop reason: HOSPADM

## 2019-01-01 RX ORDER — DONEPEZIL HYDROCHLORIDE 5 MG/1
5 TABLET, FILM COATED ORAL DAILY
Status: DISCONTINUED | OUTPATIENT
Start: 2019-01-01 | End: 2019-01-01 | Stop reason: HOSPADM

## 2019-01-01 RX ORDER — FUROSEMIDE 40 MG/1
40 TABLET ORAL DAILY
Status: DISCONTINUED | OUTPATIENT
Start: 2019-01-01 | End: 2019-01-01

## 2019-01-01 RX ORDER — MIDODRINE HYDROCHLORIDE 10 MG/1
TABLET ORAL
Refills: 0 | COMMUNITY
Start: 2019-01-01 | End: 2019-01-01

## 2019-01-01 RX ORDER — OLANZAPINE 5 MG/1
5 TABLET ORAL NIGHTLY PRN
Status: DISCONTINUED | OUTPATIENT
Start: 2019-01-01 | End: 2019-01-01

## 2019-01-01 RX ORDER — DONEPEZIL HYDROCHLORIDE 5 MG/1
5 TABLET, FILM COATED ORAL DAILY
Refills: 0 | COMMUNITY
Start: 2019-01-01 | End: 2019-01-01 | Stop reason: HOSPADM

## 2019-01-01 RX ORDER — MIRTAZAPINE 7.5 MG/1
7.5 TABLET, FILM COATED ORAL NIGHTLY
Qty: 30 TABLET | Refills: 0 | Status: SHIPPED | OUTPATIENT
Start: 2019-01-01

## 2019-01-01 RX ORDER — DONEPEZIL HYDROCHLORIDE 5 MG/1
5 TABLET, FILM COATED ORAL NIGHTLY
Status: DISCONTINUED | OUTPATIENT
Start: 2019-01-01 | End: 2019-01-01 | Stop reason: HOSPADM

## 2019-01-01 RX ORDER — MIDODRINE HYDROCHLORIDE 5 MG/1
10 TABLET ORAL 3 TIMES DAILY
Status: DISCONTINUED | OUTPATIENT
Start: 2019-01-01 | End: 2019-01-01

## 2019-01-01 RX ORDER — LORAZEPAM 2 MG/ML
0.5 INJECTION INTRAMUSCULAR ONCE AS NEEDED
Status: COMPLETED | OUTPATIENT
Start: 2019-01-01 | End: 2019-01-01

## 2019-01-01 RX ORDER — LIDOCAINE HYDROCHLORIDE 10 MG/ML
20 INJECTION, SOLUTION INFILTRATION; PERINEURAL ONCE
Status: COMPLETED | OUTPATIENT
Start: 2019-01-01 | End: 2019-01-01

## 2019-01-01 RX ORDER — ZIPRASIDONE MESYLATE 20 MG/ML
10 INJECTION, POWDER, LYOPHILIZED, FOR SOLUTION INTRAMUSCULAR ONCE
Status: COMPLETED | OUTPATIENT
Start: 2019-01-01 | End: 2019-01-01

## 2019-01-01 RX ORDER — POTASSIUM CHLORIDE 20 MEQ/1
20 TABLET, EXTENDED RELEASE ORAL DAILY
Refills: 0 | COMMUNITY
Start: 2019-01-01

## 2019-01-01 RX ORDER — DONEPEZIL HYDROCHLORIDE 5 MG/1
5 TABLET, FILM COATED ORAL NIGHTLY
Qty: 30 TABLET | Refills: 0 | Status: SHIPPED | OUTPATIENT
Start: 2019-01-01

## 2019-01-01 RX ORDER — ALUMINA, MAGNESIA, AND SIMETHICONE 2400; 2400; 240 MG/30ML; MG/30ML; MG/30ML
15 SUSPENSION ORAL EVERY 6 HOURS PRN
Status: DISCONTINUED | OUTPATIENT
Start: 2019-01-01 | End: 2019-01-01 | Stop reason: HOSPADM

## 2019-01-01 RX ORDER — POTASSIUM CHLORIDE 750 MG/1
10 TABLET, EXTENDED RELEASE ORAL DAILY
COMMUNITY
End: 2019-01-01

## 2019-01-01 RX ADMIN — Medication 400 MG: at 09:57

## 2019-01-01 RX ADMIN — DEXTROSE MONOHYDRATE 25 G: 25 INJECTION, SOLUTION INTRAVENOUS at 10:57

## 2019-01-01 RX ADMIN — Medication 400 MG: at 09:56

## 2019-01-01 RX ADMIN — FUROSEMIDE 40 MG: 40 TABLET ORAL at 09:43

## 2019-01-01 RX ADMIN — ASPIRIN 81 MG: 81 TABLET, COATED ORAL at 13:38

## 2019-01-01 RX ADMIN — ASPIRIN 81 MG: 81 TABLET, COATED ORAL at 08:29

## 2019-01-01 RX ADMIN — FUROSEMIDE 40 MG: 10 INJECTION, SOLUTION INTRAMUSCULAR; INTRAVENOUS at 17:14

## 2019-01-01 RX ADMIN — ONDANSETRON 4 MG: 2 INJECTION INTRAMUSCULAR; INTRAVENOUS at 14:13

## 2019-01-01 RX ADMIN — ACETAMINOPHEN 650 MG: 325 TABLET, FILM COATED ORAL at 21:14

## 2019-01-01 RX ADMIN — SODIUM CHLORIDE, PRESERVATIVE FREE 10 ML: 5 INJECTION INTRAVENOUS at 09:25

## 2019-01-01 RX ADMIN — ATORVASTATIN CALCIUM 10 MG: 10 TABLET, FILM COATED ORAL at 10:35

## 2019-01-01 RX ADMIN — MIRTAZAPINE 7.5 MG: 15 TABLET, FILM COATED ORAL at 20:09

## 2019-01-01 RX ADMIN — POTASSIUM CHLORIDE 20 MEQ: 750 CAPSULE, EXTENDED RELEASE ORAL at 13:37

## 2019-01-01 RX ADMIN — DONEPEZIL HYDROCHLORIDE 5 MG: 5 TABLET, FILM COATED ORAL at 09:58

## 2019-01-01 RX ADMIN — MIRTAZAPINE 7.5 MG: 15 TABLET, FILM COATED ORAL at 20:58

## 2019-01-01 RX ADMIN — METOPROLOL SUCCINATE 12.5 MG: 25 TABLET, FILM COATED, EXTENDED RELEASE ORAL at 13:38

## 2019-01-01 RX ADMIN — DONEPEZIL HYDROCHLORIDE 5 MG: 5 TABLET, FILM COATED ORAL at 21:01

## 2019-01-01 RX ADMIN — SODIUM CHLORIDE, PRESERVATIVE FREE 10 ML: 5 INJECTION INTRAVENOUS at 20:09

## 2019-01-01 RX ADMIN — FUROSEMIDE 40 MG: 40 TABLET ORAL at 13:38

## 2019-01-01 RX ADMIN — FERROUS SULFATE TAB 325 MG (65 MG ELEMENTAL FE) 325 MG: 325 (65 FE) TAB at 08:17

## 2019-01-01 RX ADMIN — INSULIN LISPRO 2 UNITS: 100 INJECTION, SOLUTION INTRAVENOUS; SUBCUTANEOUS at 12:13

## 2019-01-01 RX ADMIN — ASPIRIN 81 MG: 81 TABLET, COATED ORAL at 08:17

## 2019-01-01 RX ADMIN — METOPROLOL SUCCINATE 12.5 MG: 25 TABLET, FILM COATED, EXTENDED RELEASE ORAL at 08:29

## 2019-01-01 RX ADMIN — FUROSEMIDE 40 MG: 40 TABLET ORAL at 09:56

## 2019-01-01 RX ADMIN — IOPAMIDOL 85 ML: 612 INJECTION, SOLUTION INTRAVENOUS at 15:12

## 2019-01-01 RX ADMIN — Medication 5 MG: at 20:09

## 2019-01-01 RX ADMIN — Medication 400 MG: at 22:01

## 2019-01-01 RX ADMIN — ARIPIPRAZOLE 5 MG: 5 TABLET ORAL at 23:02

## 2019-01-01 RX ADMIN — FUROSEMIDE 40 MG: 40 TABLET ORAL at 08:29

## 2019-01-01 RX ADMIN — ATORVASTATIN CALCIUM 10 MG: 10 TABLET, FILM COATED ORAL at 09:57

## 2019-01-01 RX ADMIN — INSULIN LISPRO 2 UNITS: 100 INJECTION, SOLUTION INTRAVENOUS; SUBCUTANEOUS at 12:27

## 2019-01-01 RX ADMIN — POTASSIUM CHLORIDE 20 MEQ: 750 CAPSULE, EXTENDED RELEASE ORAL at 17:28

## 2019-01-01 RX ADMIN — Medication 5 MG: at 20:06

## 2019-01-01 RX ADMIN — FERROUS SULFATE TAB 325 MG (65 MG ELEMENTAL FE) 325 MG: 325 (65 FE) TAB at 09:44

## 2019-01-01 RX ADMIN — FUROSEMIDE 40 MG: 40 TABLET ORAL at 09:57

## 2019-01-01 RX ADMIN — MIRTAZAPINE 7.5 MG: 15 TABLET, FILM COATED ORAL at 20:06

## 2019-01-01 RX ADMIN — ZIPRASIDONE MESYLATE 10 MG: 20 INJECTION, POWDER, LYOPHILIZED, FOR SOLUTION INTRAMUSCULAR at 16:37

## 2019-01-01 RX ADMIN — Medication 400 MG: at 20:25

## 2019-01-01 RX ADMIN — MIRTAZAPINE 7.5 MG: 15 TABLET, FILM COATED ORAL at 22:02

## 2019-01-01 RX ADMIN — ATORVASTATIN CALCIUM 10 MG: 10 TABLET, FILM COATED ORAL at 13:38

## 2019-01-01 RX ADMIN — OLANZAPINE 5 MG: 5 TABLET, FILM COATED ORAL at 20:08

## 2019-01-01 RX ADMIN — ENOXAPARIN SODIUM 40 MG: 40 INJECTION SUBCUTANEOUS at 21:45

## 2019-01-01 RX ADMIN — MIRTAZAPINE 7.5 MG: 15 TABLET, FILM COATED ORAL at 22:10

## 2019-01-01 RX ADMIN — POTASSIUM CHLORIDE 20 MEQ: 1.5 POWDER, FOR SOLUTION ORAL at 08:17

## 2019-01-01 RX ADMIN — MIRTAZAPINE 7.5 MG: 15 TABLET, FILM COATED ORAL at 20:25

## 2019-01-01 RX ADMIN — Medication 400 MG: at 21:01

## 2019-01-01 RX ADMIN — METOPROLOL SUCCINATE 12.5 MG: 25 TABLET, FILM COATED, EXTENDED RELEASE ORAL at 09:55

## 2019-01-01 RX ADMIN — ENOXAPARIN SODIUM 30 MG: 30 INJECTION SUBCUTANEOUS at 20:10

## 2019-01-01 RX ADMIN — OLANZAPINE 5 MG: 5 TABLET, FILM COATED ORAL at 00:10

## 2019-01-01 RX ADMIN — ATORVASTATIN CALCIUM 10 MG: 10 TABLET, FILM COATED ORAL at 08:17

## 2019-01-01 RX ADMIN — ASPIRIN 81 MG: 81 TABLET, COATED ORAL at 09:44

## 2019-01-01 RX ADMIN — DONEPEZIL HYDROCHLORIDE 5 MG: 5 TABLET, FILM COATED ORAL at 13:38

## 2019-01-01 RX ADMIN — FERROUS SULFATE TAB 325 MG (65 MG ELEMENTAL FE) 325 MG: 325 (65 FE) TAB at 08:29

## 2019-01-01 RX ADMIN — SODIUM CHLORIDE, PRESERVATIVE FREE 10 ML: 5 INJECTION INTRAVENOUS at 21:45

## 2019-01-01 RX ADMIN — MIRTAZAPINE 7.5 MG: 15 TABLET, FILM COATED ORAL at 21:01

## 2019-01-01 RX ADMIN — MIDODRINE HYDROCHLORIDE 10 MG: 5 TABLET ORAL at 22:10

## 2019-01-01 RX ADMIN — OLANZAPINE 5 MG: 5 TABLET, FILM COATED ORAL at 17:51

## 2019-01-01 RX ADMIN — Medication 400 MG: at 22:10

## 2019-01-01 RX ADMIN — ENOXAPARIN SODIUM 30 MG: 30 INJECTION SUBCUTANEOUS at 21:06

## 2019-01-01 RX ADMIN — FUROSEMIDE 40 MG: 10 INJECTION, SOLUTION INTRAMUSCULAR; INTRAVENOUS at 08:18

## 2019-01-01 RX ADMIN — FERROUS SULFATE TAB 325 MG (65 MG ELEMENTAL FE) 325 MG: 325 (65 FE) TAB at 09:57

## 2019-01-01 RX ADMIN — METOPROLOL SUCCINATE 12.5 MG: 25 TABLET, FILM COATED, EXTENDED RELEASE ORAL at 13:53

## 2019-01-01 RX ADMIN — ATORVASTATIN CALCIUM 10 MG: 10 TABLET, FILM COATED ORAL at 13:51

## 2019-01-01 RX ADMIN — METOPROLOL SUCCINATE 12.5 MG: 25 TABLET, FILM COATED, EXTENDED RELEASE ORAL at 09:43

## 2019-01-01 RX ADMIN — FERROUS SULFATE TAB 325 MG (65 MG ELEMENTAL FE) 325 MG: 325 (65 FE) TAB at 13:37

## 2019-01-01 RX ADMIN — Medication 5 MG: at 20:57

## 2019-01-01 RX ADMIN — ARIPIPRAZOLE 2 MG: 2 TABLET ORAL at 09:44

## 2019-01-01 RX ADMIN — Medication 400 MG: at 08:29

## 2019-01-01 RX ADMIN — LIDOCAINE HYDROCHLORIDE 18 ML: 10 INJECTION, SOLUTION INFILTRATION; PERINEURAL at 12:28

## 2019-01-01 RX ADMIN — Medication 400 MG: at 20:06

## 2019-01-01 RX ADMIN — ACETAMINOPHEN 350 MG: 325 TABLET, FILM COATED ORAL at 21:46

## 2019-01-01 RX ADMIN — INSULIN LISPRO 2 UNITS: 100 INJECTION, SOLUTION INTRAVENOUS; SUBCUTANEOUS at 09:56

## 2019-01-01 RX ADMIN — ARIPIPRAZOLE 2 MG: 2 TABLET ORAL at 09:58

## 2019-01-01 RX ADMIN — DONEPEZIL HYDROCHLORIDE 5 MG: 5 TABLET, FILM COATED ORAL at 08:17

## 2019-01-01 RX ADMIN — SODIUM CHLORIDE, PRESERVATIVE FREE 10 ML: 5 INJECTION INTRAVENOUS at 22:04

## 2019-01-01 RX ADMIN — SODIUM CHLORIDE, PRESERVATIVE FREE 10 ML: 5 INJECTION INTRAVENOUS at 10:40

## 2019-01-01 RX ADMIN — POTASSIUM CHLORIDE 20 MEQ: 750 CAPSULE, EXTENDED RELEASE ORAL at 09:43

## 2019-01-01 RX ADMIN — ARIPIPRAZOLE 2 MG: 2 TABLET ORAL at 13:53

## 2019-01-01 RX ADMIN — DONEPEZIL HYDROCHLORIDE 5 MG: 5 TABLET, FILM COATED ORAL at 20:25

## 2019-01-01 RX ADMIN — Medication 400 MG: at 08:17

## 2019-01-01 RX ADMIN — POTASSIUM CHLORIDE 20 MEQ: 750 CAPSULE, EXTENDED RELEASE ORAL at 08:29

## 2019-01-01 RX ADMIN — ASPIRIN 81 MG: 81 TABLET, COATED ORAL at 09:58

## 2019-01-01 RX ADMIN — POTASSIUM CHLORIDE 20 MEQ: 750 CAPSULE, EXTENDED RELEASE ORAL at 09:58

## 2019-01-01 RX ADMIN — ATORVASTATIN CALCIUM 10 MG: 10 TABLET, FILM COATED ORAL at 08:29

## 2019-01-01 RX ADMIN — MORPHINE SULFATE 4 MG: 2 INJECTION, SOLUTION INTRAMUSCULAR; INTRAVENOUS at 14:13

## 2019-01-01 RX ADMIN — FUROSEMIDE 40 MG: 10 INJECTION, SOLUTION INTRAMUSCULAR; INTRAVENOUS at 21:44

## 2019-01-01 RX ADMIN — Medication 5 MG: at 21:01

## 2019-01-01 RX ADMIN — FUROSEMIDE 40 MG: 40 TABLET ORAL at 13:51

## 2019-01-01 RX ADMIN — Medication 400 MG: at 20:09

## 2019-01-01 RX ADMIN — SODIUM CHLORIDE, PRESERVATIVE FREE 10 ML: 5 INJECTION INTRAVENOUS at 20:08

## 2019-01-01 RX ADMIN — Medication 5 MG: at 20:25

## 2019-01-01 RX ADMIN — ASPIRIN 81 MG: 81 TABLET, COATED ORAL at 09:57

## 2019-01-01 RX ADMIN — SODIUM CHLORIDE, PRESERVATIVE FREE 10 ML: 5 INJECTION INTRAVENOUS at 10:57

## 2019-01-01 RX ADMIN — DONEPEZIL HYDROCHLORIDE 5 MG: 5 TABLET, FILM COATED ORAL at 08:29

## 2019-01-01 RX ADMIN — SODIUM CHLORIDE, PRESERVATIVE FREE 10 ML: 5 INJECTION INTRAVENOUS at 10:02

## 2019-01-01 RX ADMIN — METOPROLOL SUCCINATE 12.5 MG: 25 TABLET, FILM COATED, EXTENDED RELEASE ORAL at 09:59

## 2019-01-01 RX ADMIN — SODIUM CHLORIDE, PRESERVATIVE FREE 10 ML: 5 INJECTION INTRAVENOUS at 21:06

## 2019-01-01 RX ADMIN — MIDODRINE HYDROCHLORIDE 10 MG: 5 TABLET ORAL at 08:17

## 2019-01-01 RX ADMIN — METOPROLOL SUCCINATE 12.5 MG: 25 TABLET, FILM COATED, EXTENDED RELEASE ORAL at 08:17

## 2019-01-01 RX ADMIN — Medication 5 MG: at 22:02

## 2019-01-01 RX ADMIN — SODIUM CHLORIDE, PRESERVATIVE FREE 10 ML: 5 INJECTION INTRAVENOUS at 08:21

## 2019-01-01 RX ADMIN — SODIUM CHLORIDE 125 ML/HR: 9 INJECTION, SOLUTION INTRAVENOUS at 14:14

## 2019-01-01 RX ADMIN — ATORVASTATIN CALCIUM 10 MG: 10 TABLET, FILM COATED ORAL at 09:43

## 2019-01-01 RX ADMIN — ENOXAPARIN SODIUM 30 MG: 30 INJECTION SUBCUTANEOUS at 22:02

## 2019-01-01 RX ADMIN — Medication 400 MG: at 09:43

## 2019-01-01 RX ADMIN — LORAZEPAM 0.5 MG: 2 INJECTION INTRAMUSCULAR; INTRAVENOUS at 10:50

## 2019-05-09 NOTE — ED PROVIDER NOTES
EMERGENCY DEPARTMENT ENCOUNTER    CHIEF COMPLAINT  Chief Complaint: LLQ abd pain  History given by: pt, pt's son  History limited by: none  Room Number: 42/42  PMD: Ronda Centeno MD      HPI:  Pt is a 88 y.o. female who presents complaining of chronic LLQ abd pain worsening severely one week ago. Pt states pain is worse with 'stretching' and movement. Pt confirms nausea. Pt denies radiation of pain into back and LE, vomiting, bloody stool and other complaints at this time. Pt went to  today and came to the ER. Pt surgical hx of hysterectomy.     Duration:  Chronic, worsening one week ago  Onset: gradual  Timing: constant  Location: L groin  Radiation: none  Quality: pain  Intensity/Severity: moderate  Progression: worsening  Associated Symptoms: nausea  Aggravating Factors: 'stretching,' movement  Alleviating Factors: none  Previous Episodes: chronic hx  Treatment before arrival: none    PAST MEDICAL HISTORY  Active Ambulatory Problems     Diagnosis Date Noted   • No Active Ambulatory Problems     Resolved Ambulatory Problems     Diagnosis Date Noted   • No Resolved Ambulatory Problems     Past Medical History:   Diagnosis Date   • Hyperlipidemia    • Hypertension        PAST SURGICAL HISTORY  Past Surgical History:   Procedure Laterality Date   • HYSTERECTOMY     • THYROID SURGERY  2018       FAMILY HISTORY  History reviewed. No pertinent family history.    SOCIAL HISTORY  Social History     Socioeconomic History   • Marital status:      Spouse name: Not on file   • Number of children: Not on file   • Years of education: Not on file   • Highest education level: Not on file   Tobacco Use   • Smoking status: Never Smoker   • Smokeless tobacco: Never Used   Substance and Sexual Activity   • Alcohol use: No     Frequency: Never   • Drug use: No       ALLERGIES  Patient has no known allergies.    REVIEW OF SYSTEMS  Review of Systems   Constitutional: Negative for fever.   HENT: Negative for sore throat.     Eyes: Negative.    Respiratory: Negative for cough and shortness of breath.    Cardiovascular: Negative for chest pain.   Gastrointestinal: Positive for nausea. Negative for abdominal pain, diarrhea and vomiting.   Genitourinary: Negative for dysuria.   Musculoskeletal: Negative for neck pain.        L groin pain   Skin: Negative for rash.   Neurological: Negative for weakness, numbness and headaches.   Hematological: Negative.    Psychiatric/Behavioral: Negative.    All other systems reviewed and are negative.      PHYSICAL EXAM  ED Triage Vitals [05/09/19 1146]   Temp Heart Rate Resp BP SpO2   98.7 °F (37.1 °C) 116 20 167/54 99 %      Temp src Heart Rate Source Patient Position BP Location FiO2 (%)   Tympanic Monitor -- -- --       Physical Exam   Constitutional: She is oriented to person, place, and time. No distress.   HENT:   Head: Normocephalic and atraumatic.   Eyes: EOM are normal. Pupils are equal, round, and reactive to light.   Neck: Normal range of motion. Neck supple.   Cardiovascular: Normal rate, regular rhythm and normal heart sounds.   Pulmonary/Chest: Effort normal and breath sounds normal. No respiratory distress.   Abdominal: Soft. There is tenderness in the left lower quadrant. There is no rebound and no guarding.   fluctuance   Musculoskeletal: Normal range of motion. She exhibits no edema.   Neurological: She is alert and oriented to person, place, and time. She has normal sensation and normal strength.   Skin: Skin is warm and dry. No rash noted.   Psychiatric: Mood and affect normal.   Nursing note and vitals reviewed.      LAB RESULTS  Lab Results (last 24 hours)     Procedure Component Value Units Date/Time    CBC & Differential [[id number]] Collected:  05/09/19 1412    Specimen:  Blood Updated:  05/09/19 1437    Narrative:       The following orders were created for panel order CBC & Differential.  Procedure                               Abnormality         Status                      ---------                               -----------         ------                     CBC Auto Differential[[id number]]        Normal              Final result                 Please view results for these tests on the individual orders.    Comprehensive Metabolic Panel [[id number]]  (Abnormal) Collected:  05/09/19 1412    Specimen:  Blood Updated:  05/09/19 1503     Glucose 135 mg/dL      BUN 16 mg/dL      Creatinine 1.05 mg/dL      Sodium 139 mmol/L      Potassium 5.4 mmol/L      Comment: Specimen hemolyzed.  Results may be affected.        Chloride 103 mmol/L      CO2 22.5 mmol/L      Calcium 9.2 mg/dL      Total Protein 7.0 g/dL      Albumin 4.10 g/dL      ALT (SGPT) 14 U/L      Comment: Specimen hemolyzed.  Results may be affected.        AST (SGOT) 33 U/L      Comment: Specimen hemolyzed.  Results may be affected.        Alkaline Phosphatase 106 U/L      Total Bilirubin 0.5 mg/dL      eGFR Non African Amer 49 mL/min/1.73      Globulin 2.9 gm/dL      A/G Ratio 1.4 g/dL      BUN/Creatinine Ratio 15.2     Anion Gap 13.5 mmol/L     Narrative:       GFR Normal >60  Chronic Kidney Disease <60  Kidney Failure <15    CBC Auto Differential [[id number]]  (Normal) Collected:  05/09/19 1412    Specimen:  Blood Updated:  05/09/19 1437     WBC 7.01 10*3/mm3      RBC 4.82 10*6/mm3      Hemoglobin 13.8 g/dL      Hematocrit 41.3 %      MCV 85.7 fL      MCH 28.6 pg      MCHC 33.4 g/dL      RDW 12.9 %      RDW-SD 40.0 fl      MPV 10.4 fL      Platelets 223 10*3/mm3      Neutrophil % 67.6 %      Lymphocyte % 22.7 %      Monocyte % 6.7 %      Eosinophil % 2.3 %      Basophil % 0.4 %      Immature Grans % 0.3 %      Neutrophils, Absolute 4.74 10*3/mm3      Lymphocytes, Absolute 1.59 10*3/mm3      Monocytes, Absolute 0.47 10*3/mm3      Eosinophils, Absolute 0.16 10*3/mm3      Basophils, Absolute 0.03 10*3/mm3      Immature Grans, Absolute 0.02 10*3/mm3      nRBC 0.0 /100 WBC           I ordered the above labs and reviewed the  results    RADIOLOGY  CT Abdomen Pelvis With Contrast    (Results Pending)      Reviewed CT abd/pelvis: nothing acute. Independently viewed by me. Interpreted by radiologist. Discussed with Dr. Burton.      I ordered the above noted radiological studies. Interpreted by radiologist. Discussed with radiologist. Reviewed by me in PACS.       PROCEDURES  Procedures      PROGRESS AND CONSULTS     1238. Ordered Zofran for pt's nausea and morphine for pt's pain. Ordered CT abd/pelvis with contrast. Ordered labwork for pt workup.     1535. Discussed pt's case with Dr. Zapata, radiology, who sees nothing acute in the LLQ.     1356. Rechecked pt. Pt resting comfortably, but admits to still being in some pain. Discussed unremarkable labwork and CT results and plan for discharge with follow-up with PCP. Pt understands and agrees with the plan, all questions answered.      MEDICAL DECISION MAKING  Discussed all lab and imaging with the patient.  The patient's abdominal exam has improved.  There is no evidence of serious intra-abdominal pathology: There is no diverticulitis, no appendicitis, no evidence for aortic aneurysm, there is no renal stone.  I explained that the patient should return to the emergency department should the pain recur or for any new symptoms (fever, blood in emesis/stool).  I also advised the patient to follow up with their PMD for further evaluation.  My clinical suspicion for serious intra-abdominal pathology is low, and the patient can be safely discharged home for outpatient follow up.      Results were reviewed/discussed with the patient and they were also made aware of online access. Pt also made aware that some labs, such as cultures, will not be resulted during ER visit and follow up with PMD is necessary.     MDM  Number of Diagnoses or Management Options     Amount and/or Complexity of Data Reviewed  Clinical lab tests: ordered and reviewed (Labwork is unremarkable.)  Tests in the  radiology section of CPT®: ordered and reviewed (CT abd/pelvis: nothing acute)  Discussion of test results with the performing providers: yes (Dr. Burton, radiology)    Patient Progress  Patient progress: stable         DIAGNOSIS  Final diagnoses:   LLQ pain       DISPOSITION  DISCHARGE    Patient discharged in stable condition.    Reviewed implications of results, diagnosis, meds, responsibility to follow up, warning signs and symptoms of possible worsening, potential complications and reasons to return to ER.    Patient/Family voiced understanding of above instructions.    Discussed plan for discharge, as there is no emergent indication for admission. Patient referred to primary care provider for BP management due to today's BP. Pt/family is agreeable and understands need for follow up and repeat testing.  Pt is aware that discharge does not mean that nothing is wrong but it indicates no emergency is present that requires admission and they must continue care with follow-up as given below or physician of their choice.     FOLLOW-UP  Ronda Centeno MD  [address] [facility] [address] [phone]    Schedule an appointment as soon as possible for a visit       [facility] Emergency Department  [address] [facility] [address] [phone]    As needed, If symptoms worsen         Medication List      New Prescriptions    diclofenac 50 MG EC tablet  Commonly known as:  VOLTAREN  Take 1 tablet by mouth 2 (Two) Times a Day.              Latest Documented Vital Signs:  As of 4:02 PM  BP- 167/54 HR- 50 Temp- 98.7 °F (37.1 °C) (Tympanic) O2 sat- 95%    --  Documentation assistance provided by audi De La Paz for Dr. Raymond.  Information recorded by the scribe was done at my direction and has been verified and validated by me.       Eleanor De La Paz  05/09/19 9642       Sang Raymond MD  05/09/19 3330

## 2019-07-19 NOTE — TELEPHONE ENCOUNTER
JOSE DE JESUS Rodriguez with [facility] called to let you know that a nurse went out to draw patients blood and after 10 minutes she had a syncope episode. She did not pass out all the way. She did not want 911 to be called or go to the ER. [facility] will send over a note.

## 2019-07-19 NOTE — TELEPHONE ENCOUNTER
Please call the pt and let her know that if she feels she needs an appointment then she can make one. If she just got dizzy and almost passed out with a blood draw but feels fine now, she does not have to make one.

## 2019-07-19 NOTE — TELEPHONE ENCOUNTER
I spoke to patient, she is doing good, she said it was just when she josie her blood, that is went she got dizzy. I told her to make an appointment if she gets dizzy again. She said she would.

## 2019-07-23 NOTE — TELEPHONE ENCOUNTER
Spoke to patient, she understood and is going to let her grand daughter take her to the ER to be evaluated.

## 2019-07-25 NOTE — TELEPHONE ENCOUNTER
Kamilla from [facility] called.  She had labs done at [place].  She was calling to see if we got the results, but they were faxed to the [place] [facility] fax number.  She wanted to let you know her  EGFR is 36.3 and bun/Creaton is 38.46.   Her number is [phone].

## 2019-07-26 NOTE — TELEPHONE ENCOUNTER
I spoke to Olivia and she is going to fax the lab results to Dr. Centeno at [facility].

## 2019-07-29 NOTE — TELEPHONE ENCOUNTER
Starla called from [facility], patient blood pressure sitting was 90/55 and walking 80/50. Patient is weak and lightheaded after morning meds. She is having increased shortness of breath.     I spoke to Dr. Centeno She said the patient could go to the ER. She could also stop her Amlodipine and if she has stopped that, then stop the Lisinopril. She does have an appointment tomorrow at 2:15.     I gave this information to Starla MILLER  [phone], she understood.

## 2019-07-30 PROBLEM — E11.9 TYPE 2 DIABETES MELLITUS WITHOUT COMPLICATION, WITHOUT LONG-TERM CURRENT USE OF INSULIN (HCC): Status: ACTIVE | Noted: 2019-01-01

## 2019-07-30 PROBLEM — E78.5 HYPERLIPIDEMIA: Status: ACTIVE | Noted: 2018-03-26

## 2019-07-30 PROBLEM — I10 HTN (HYPERTENSION): Status: ACTIVE | Noted: 2018-03-26

## 2019-07-30 PROBLEM — F02.80 LATE ONSET ALZHEIMER'S DISEASE WITHOUT BEHAVIORAL DISTURBANCE (HCC): Status: ACTIVE | Noted: 2019-01-01

## 2019-07-30 PROBLEM — I50.9 ACUTE CONGESTIVE HEART FAILURE (HCC): Status: ACTIVE | Noted: 2019-01-01

## 2019-07-30 PROBLEM — G30.1 LATE ONSET ALZHEIMER'S DISEASE WITHOUT BEHAVIORAL DISTURBANCE (HCC): Status: ACTIVE | Noted: 2019-01-01

## 2019-07-30 PROBLEM — E21.3 HYPERPARATHYROIDISM (HCC): Status: ACTIVE | Noted: 2019-01-01

## 2019-07-30 PROBLEM — I87.2 CHRONIC VENOUS INSUFFICIENCY: Status: ACTIVE | Noted: 2019-01-01

## 2019-07-30 NOTE — PROGRESS NOTES
"Subjective   Lindsay Martinez is a 89 y.o. female.     Chief Complaint   Patient presents with   • Congestive Heart Failure     07/12 - 07/15 hospital follow up        History of Present Illness   htn- BP was going to low so I stopped amlodipine and she has not been on this for one day.     hld- due labs, on meds    Dementia- stable on meds    dm2- not on meds    Was in the hospital for CHF exacerbation. Records requested. Was diuresed. Breathing and mentation improved and taking meds. Has cardiology f/u in a few weeks. Weight stable since she left the hospital per granddaughter.     The following portions of the patient's history were reviewed and updated as appropriate: allergies, current medications, past family history, past medical history, past social history, past surgical history and problem list.    Past Medical History:   Diagnosis Date   • Hyperlipidemia    • Hypertension        Past Surgical History:   Procedure Laterality Date   • HERNIA REPAIR     • HYSTERECTOMY     • THYROID SURGERY  2018       Family History   Problem Relation Age of Onset   • Diabetes Mother    • Hypertension Mother    • Heart disease Mother    • Cancer Father    • Cancer Daughter        Social History     Socioeconomic History   • Marital status:      Spouse name: Not on file   • Number of children: Not on file   • Years of education: Not on file   • Highest education level: Not on file   Tobacco Use   • Smoking status: Never Smoker   • Smokeless tobacco: Never Used   Substance and Sexual Activity   • Alcohol use: No     Frequency: Never   • Drug use: No       Review of Systems   Respiratory: Negative for shortness of breath.    Cardiovascular: Negative for chest pain.       Objective   Visit Vitals  /60   Pulse 57   Temp 97.6 °F (36.4 °C) (Oral)   Ht 152.4 cm (60\")   Wt 62.2 kg (137 lb 3.2 oz)   SpO2 99%   BMI 26.80 kg/m²     Body mass index is 26.8 kg/m².  Physical Exam   Constitutional: She is oriented to person, place, " and time. She appears well-developed and well-nourished.   Cardiovascular: Normal rate, regular rhythm, normal heart sounds and intact distal pulses.   Pulmonary/Chest: Effort normal and breath sounds normal.   Musculoskeletal: Normal range of motion. She exhibits no edema.   With cane   Neurological: She is alert and oriented to person, place, and time.   Skin: Skin is warm and dry.   Psychiatric: She has a normal mood and affect. Her behavior is normal.         Assessment/Plan   Lindsay was seen today for congestive heart failure.    Diagnoses and all orders for this visit:    Essential hypertension  -     metoprolol succinate XL (TOPROL-XL) 25 MG 24 hr tablet; Take 1 tablet by mouth Daily.  -     lisinopril (PRINIVIL,ZESTRIL) 10 MG tablet; Take 1 tablet by mouth Daily.    Mixed hyperlipidemia    Type 2 diabetes mellitus without complication, without long-term current use of insulin (CMS/HCC)  -     Comprehensive Metabolic Panel  -     Lipid Panel  -     Hemoglobin A1c    Late onset Alzheimer's disease without behavioral disturbance    Acute congestive heart failure, unspecified heart failure type (CMS/HCC)          Doing well, cont meds, f/u in 3-6 months.

## 2019-08-02 NOTE — TELEPHONE ENCOUNTER
Starla from [facility] called regarding Lindsay's blood pressure. She stopped the Amlodipine 2 days ago. She is still dizzy and her blood pressure sitting down is 109/52 and walking is 100/50. Do you want her to stop the Lisinopril?     Please advise [phone].

## 2019-08-19 PROBLEM — I21.9 MYOCARDIAL INFARCTION (HCC): Status: ACTIVE | Noted: 2019-01-01

## 2019-08-19 NOTE — PROGRESS NOTES
"Nato Martinez is a 89 y.o. female.     Chief Complaint   Patient presents with   • follow up on labs       History of Present Illness   htn- still having low bp with dizziness although improved after stopping amlodipine. Is on midodrine.     Pt had an MI, records requested, has had a cath, no stents placed. Is not a surgical candidate due to her age. Has f/u with cardiology in 2-3 weeks. Pt cannot decide if she wants to be a do not hospitalize. No further chest pain and is here for repeat labs.  Anemia possibly from blood draws. No signs of bleeding.     The following portions of the patient's history were reviewed and updated as appropriate: allergies, current medications, past family history, past medical history, past social history, past surgical history and problem list.    Past Medical History:   Diagnosis Date   • Hyperlipidemia    • Hypertension        Past Surgical History:   Procedure Laterality Date   • HERNIA REPAIR     • HYSTERECTOMY     • THYROID SURGERY  2018       Family History   Problem Relation Age of Onset   • Diabetes Mother    • Hypertension Mother    • Heart disease Mother    • Cancer Father    • Cancer Daughter        Social History     Socioeconomic History   • Marital status:      Spouse name: Not on file   • Number of children: Not on file   • Years of education: Not on file   • Highest education level: Not on file   Tobacco Use   • Smoking status: Never Smoker   • Smokeless tobacco: Never Used   Substance and Sexual Activity   • Alcohol use: No     Frequency: Never   • Drug use: No       Review of Systems   Respiratory: Negative for shortness of breath.    Cardiovascular: Negative for chest pain.       Objective   Visit Vitals  /54   Pulse 65   Temp 97.5 °F (36.4 °C) (Temporal)   Ht 152.4 cm (60\")   Wt 59.9 kg (132 lb)   SpO2 99%   BMI 25.78 kg/m²     Body mass index is 25.78 kg/m².  Physical Exam   Constitutional: She is oriented to person, place, and time. She " appears well-developed and well-nourished.   Cardiovascular: Normal rate, regular rhythm, normal heart sounds and intact distal pulses.   Pulmonary/Chest: Effort normal and breath sounds normal.   Musculoskeletal: Normal range of motion. She exhibits no edema.   Neurological: She is alert and oriented to person, place, and time.   Skin: Skin is warm and dry.   Psychiatric: She has a normal mood and affect. Her behavior is normal.         Assessment/Plan   Lindsay was seen today for follow up on labs.    Diagnoses and all orders for this visit:    Essential hypertension  -     Comprehensive Metabolic Panel  -     CBC & Differential    Acute congestive heart failure, unspecified heart failure type (CMS/HCC)  -     CBC & Differential    Myocardial infarction, unspecified MI type, unspecified artery (CMS/HCC)    Anemia, unspecified type  -     ferrous sulfate (IRON SUPPLEMENT) 325 (65 FE) MG tablet; Take 1 tablet by mouth Daily With Breakfast.             will compare labs to hospital and from home health and start iron.

## 2019-09-24 NOTE — TELEPHONE ENCOUNTER
Jennifer wanted to let you know that Lindsay is having her pacemaker surgery on Friday at [facility]. She will follow up with you when Lindsay is feeling better after surgery.    Subjective:      Patient ID: Jose Meredith is a 68 [facility]    [facility]         

## 2019-09-24 NOTE — TELEPHONE ENCOUNTER
Patient's grandfdaughter/caregiver, Jennifer is requesting a call back from [facility]. She states that she is wanting to let the doctor now that the patient is having a pacemaker put in on Friday. She had a few additional pieces of information that she would like to discuss. Call back is [phone]

## 2019-10-01 PROBLEM — I50.43 ACUTE ON CHRONIC COMBINED SYSTOLIC AND DIASTOLIC CHF (CONGESTIVE HEART FAILURE) (HCC): Status: ACTIVE | Noted: 2019-01-01

## 2019-10-01 NOTE — PROGRESS NOTES
Clinical Pharmacy Services: Medication History    Lindsay Martinez is a 89 y.o. female presenting to [facility] for   Chief Complaint   Patient presents with   • Altered Mental Status       She  has a past medical history of Altered mental state, Chronic venous insufficiency, Encephalopathy acute, Hyperlipidemia, Hypertension, and Late onset Alzheimer's disease without behavioral disturbance (CMS/HCC).    Allergies as of 10/01/2019 - Reviewed 10/01/2019   Allergen Reaction Noted   • Latex Itching 07/30/2019       Medication information was obtained from: Family, patient, pharmacy  Pharmacy and Phone Number: [facility] [phone]    Prior to Admission Medications     Prescriptions Last Dose Informant Patient Reported? Taking?    aspirin 81 MG EC tablet  Family Member Yes Yes    Take 81 mg by mouth Daily.    donepezil (ARICEPT) 5 MG tablet  Pharmacy Yes Yes    Take 5 mg by mouth Daily.    furosemide (LASIX) 20 MG tablet  Pharmacy No Yes    Take 1 tablet by mouth Daily.    Patient taking differently:  Take 20 mg by mouth 2 (Two) Times a Day. 7 AM and 2 PM    magnesium oxide (MAGOX) 400 (241.3 Mg) MG tablet tablet  Pharmacy Yes Yes    Take 400 mg by mouth 2 (Two) Times a Day.    metoprolol succinate XL (TOPROL-XL) 25 MG 24 hr tablet  Pharmacy Yes Yes    Take 12.5 mg by mouth Daily.    midodrine (PROAMATINE) 10 MG tablet  Pharmacy Yes Yes    Take 10 mg by mouth 3 (Three) Times a Day. Do not give after 6PM    mirtazapine (REMERON) 7.5 MG tablet  Pharmacy Yes Yes    Take 7.5 mg by mouth Every Night.    potassium chloride (K-DUR,KLOR-CON) 20 MEQ CR tablet  Pharmacy Yes Yes    Take 20 mEq by mouth Daily.    simvastatin (ZOCOR) 20 MG tablet  Pharmacy Yes Yes    Take 20 mg by mouth Every Night.    ferrous sulfate (IRON SUPPLEMENT) 325 (65 FE) MG tablet  Family Member No No    Take 1 tablet by mouth Daily With Breakfast.    Patient taking differently:  Take 325 mg by mouth Daily With Breakfast. Filled for 30 days on  8/19. No refills given until patient is seen by her physician.            Medication notes: B-12 injections and Pantoprazole removed, therapy complete per family. Ferrous Sulfate prescribed for 30 days only until patient is seen by physician. Patient ran out of Timolol and Travatan 3-4 months ago. Cannot be refilled until her appointment with the ophthalmologist next week.    This medication list is complete to the best of my knowledge as of 10/1/2019    Please call if questions.    Coty Plata, Medication History Technician  10/1/2019 7:28 PM

## 2019-10-01 NOTE — ED PROVIDER NOTES
MD ATTESTATION NOTE    The BLAKE and I have discussed this patient's history, physical exam, and treatment plan.  I have reviewed the documentation and personally had a face to face interaction with the patient. I affirm the documentation and agree with the treatment and plan.  The attached note describes my personal findings.      History  89-year-old female presents with confusion which began early this morning per patient's granddaughter.    Physical Exam  Vital Signs reviewed  Alert, Well Appearing in NAD him a oriented to name and hospital.  She is able to answer simple questions and follows some commands.  Lungs are decreased breath sounds bilaterally  Neuro strength and sensation is grossly intact throughout      EKG          EKG time: 1348  Rhythm/Rate: Atrial paced rhythm 68  P waves and ME: Normal P waves as well as atrial paced P waves  QRS, axis: Normal axis, questionable anterior Q waves  ST and T waves: ST and T segments are unremarkable    Interpreted Contemporaneously by me, independently viewed  No prior to compare        Disposition  I have reviewed patient's x-ray, EKG and labs.  These would suggest some degree of congestive heart failure as evidenced by elevated BNP of 15.6 thousand and chest x-ray consistent with pulmonary edema.  Urinalysis is pending at this point.  Patient's cardiologist is Neptali Bauer MD  10/01/19 1604

## 2019-10-01 NOTE — ED PROVIDER NOTES
EMERGENCY DEPARTMENT ENCOUNTER    CHIEF COMPLAINT  Chief Complaint: AMS  History given by: patient's granddaughter and patient  History limited by: AMS  Time Seen: 1330  Room Number: 44/44  PMD: Ronda Centeno MD      HPI:  Pt is a 89 y.o. female who presents with altered mental status and confusion that the patient's granddaughter noticed around 0630 this morning. The patient's granddaughter reports the patient was confused and had auditory and visual hallucinations. The patient's granddaughter reports the patient has had episodes of confusion in the past r/t congestive heart failure. The patient also complains of worsening of chronic shortness of breath and a mild cough since earlier this morning. The patient denies vomiting, diarrhea, fever, chills, abdominal pain, or chest pain. The patient's granddaughter reports the patient had a pacemaker placement last week (at [facility]) since she had a MI in 07/2019. The patient denies currently taking any pain medication. The patient's granddaughter reports the patient has a hx of urinary tract infections. There are no other complaints at this time. The patient's granddaughter is bedside.    Duration: since 0630 this morning  Timing: constant  Quality: confusion  Intensity/Severity: moderate  Progression: not specified  Associated Symptoms: auditory and visual hallucinations, worsening of chronic SOA, and a mild cough  Aggravating Factors: none specified  Alleviating Factors: none specified  Previous Episodes: The patient's granddaughter reports the patient has had episodes of confusion in the past r/t congestive heart failure.  Treatment before arrival: none specified    PAST MEDICAL HISTORY  Active Ambulatory Problems     Diagnosis Date Noted   • HTN (hypertension) 03/26/2018   • Hyperlipidemia 03/26/2018   • Late onset Alzheimer's disease without behavioral disturbance (CMS/HCC) 07/30/2019   • Chronic venous insufficiency 07/30/2019   • Type 2 diabetes  mellitus without complication, without long-term current use of insulin (CMS/[facility]) 07/30/2019   • Hyperparathyroidism (CMS/[facility]) 07/30/2019   • Acute congestive heart failure (CMS/[facility]) 07/30/2019   • Myocardial infarction (CMS/[facility]) 08/19/2019     Resolved Ambulatory Problems     Diagnosis Date Noted   • No Resolved Ambulatory Problems     Past Medical History:   Diagnosis Date   • Hyperlipidemia    • Hypertension        PAST SURGICAL HISTORY  Past Surgical History:   Procedure Laterality Date   • HERNIA REPAIR     • HYSTERECTOMY     • THYROID SURGERY  2018       FAMILY HISTORY  Family History   Problem Relation Age of Onset   • Diabetes Mother    • Hypertension Mother    • Heart disease Mother    • Cancer Father    • Cancer Daughter        SOCIAL HISTORY  Social History     Socioeconomic History   • Marital status:      Spouse name: Not on file   • Number of children: Not on file   • Years of education: Not on file   • Highest education level: Not on file   Tobacco Use   • Smoking status: Never Smoker   • Smokeless tobacco: Never Used   Substance and Sexual Activity   • Alcohol use: No     Frequency: Never   • Drug use: No         ALLERGIES  Latex    REVIEW OF SYSTEMS  Review of Systems   Unable to perform ROS: Mental status change   Constitutional: Negative for chills and fever.   Respiratory: Positive for cough (mild) and shortness of breath (worsening of chronic).    Cardiovascular: Negative for chest pain.   Gastrointestinal: Negative for abdominal pain and vomiting.   Psychiatric/Behavioral: Positive for confusion and hallucinations (auditory and visual).       PHYSICAL EXAM  ED Triage Vitals [10/01/19 1322]   Temp Heart Rate Resp BP SpO2   98.9 °F (37.2 °C) 86 16 -- 92 %      Temp src Heart Rate Source Patient Position BP Location FiO2 (%)   -- Monitor -- -- --       Physical Exam   Constitutional: She is well-developed, well-nourished, and in no distress. No distress.   HENT:   Head: Normocephalic and  atraumatic.   Mouth/Throat: Oropharynx is clear and moist and mucous membranes are normal.   Eyes: Pupils are equal, round, and reactive to light.   Neck: Normal range of motion.   Cardiovascular: Normal rate, regular rhythm and normal heart sounds.   Pulmonary/Chest: Effort normal. She has decreased breath sounds. She has no wheezes.   Abdominal: Soft. Bowel sounds are normal. There is no tenderness.   Musculoskeletal: Normal range of motion. She exhibits no edema.   Neurological: She is alert. She is disoriented.   Skin: Skin is warm and dry. No rash noted.   Psychiatric: Mood, memory, affect and judgment normal.   Nursing note and vitals reviewed.      LAB RESULTS  Recent Results (from the past 24 hour(s))   Comprehensive Metabolic Panel    Collection Time: 10/01/19  2:03 PM   Result Value Ref Range    Glucose 137 (H) 65 - 99 mg/dL    BUN 21 8 - 23 mg/dL    Creatinine 1.11 (H) 0.57 - 1.00 mg/dL    Sodium 142 136 - 145 mmol/L    Potassium 4.6 3.5 - 5.2 mmol/L    Chloride 104 98 - 107 mmol/L    CO2 23.9 22.0 - 29.0 mmol/L    Calcium 8.8 8.6 - 10.5 mg/dL    Total Protein 6.6 6.0 - 8.5 g/dL    Albumin 4.10 3.50 - 5.20 g/dL    ALT (SGPT) 6 1 - 33 U/L    AST (SGOT) 16 1 - 32 U/L    Alkaline Phosphatase 113 39 - 117 U/L    Total Bilirubin 0.5 0.2 - 1.2 mg/dL    eGFR Non African Amer 46 (L) >60 mL/min/1.73    Globulin 2.5 gm/dL    A/G Ratio 1.6 g/dL    BUN/Creatinine Ratio 18.9 7.0 - 25.0    Anion Gap 14.1 5.0 - 15.0 mmol/L   Troponin    Collection Time: 10/01/19  2:03 PM   Result Value Ref Range    Troponin T <0.010 0.000 - 0.030 ng/mL   BNP    Collection Time: 10/01/19  2:03 PM   Result Value Ref Range    proBNP 15,604.0 (H) 5.0-1,800.0 pg/mL   CBC Auto Differential    Collection Time: 10/01/19  2:03 PM   Result Value Ref Range    WBC 6.19 3.40 - 10.80 10*3/mm3    RBC 4.12 3.77 - 5.28 10*6/mm3    Hemoglobin 11.0 (L) 12.0 - 15.9 g/dL    Hematocrit 34.7 34.0 - 46.6 %    MCV 84.2 79.0 - 97.0 fL    MCH 26.7 26.6 - 33.0 pg     MCHC 31.7 31.5 - 35.7 g/dL    RDW 13.3 12.3 - 15.4 %    RDW-SD 40.8 37.0 - 54.0 fl    MPV 9.7 6.0 - 12.0 fL    Platelets 217 140 - 450 10*3/mm3    Neutrophil % 77.8 (H) 42.7 - 76.0 %    Lymphocyte % 15.7 (L) 19.6 - 45.3 %    Monocyte % 5.7 5.0 - 12.0 %    Eosinophil % 0.3 0.3 - 6.2 %    Basophil % 0.3 0.0 - 1.5 %    Immature Grans % 0.2 0.0 - 0.5 %    Neutrophils, Absolute 4.82 1.70 - 7.00 10*3/mm3    Lymphocytes, Absolute 0.97 0.70 - 3.10 10*3/mm3    Monocytes, Absolute 0.35 0.10 - 0.90 10*3/mm3    Eosinophils, Absolute 0.02 0.00 - 0.40 10*3/mm3    Basophils, Absolute 0.02 0.00 - 0.20 10*3/mm3    Immature Grans, Absolute 0.01 0.00 - 0.05 10*3/mm3    nRBC 0.0 0.0 - 0.2 /100 WBC   Light Blue Top    Collection Time: 10/01/19  2:03 PM   Result Value Ref Range    Extra Tube hold for add-on    Gold Top - SST    Collection Time: 10/01/19  2:03 PM   Result Value Ref Range    Extra Tube Hold for add-ons.    Urinalysis With Culture If Indicated - Urine, Catheter    Collection Time: 10/01/19  4:18 PM   Result Value Ref Range    Color, UA Yellow Yellow, Straw    Appearance, UA Clear Clear    pH, UA 7.0 5.0 - 8.0    Specific Gravity, UA 1.015 1.005 - 1.030    Glucose, UA Negative Negative    Ketones, UA Negative Negative    Bilirubin, UA Negative Negative    Blood, UA Negative Negative    Protein, UA Negative Negative    Leuk Esterase, UA Negative Negative    Nitrite, UA Negative Negative    Urobilinogen, UA 0.2 E.U./dL 0.2 - 1.0 E.U./dL       I ordered the above labs and reviewed the results    RADIOLOGY  XR Chest 2 View   Final Result   1. Mild cardiomegaly.   2. Bilateral interstitial fluid is consistent with pulmonary edema.   3. Alveolar fluid in the right lung base may represent pneumonia or   asymmetric pulmonary edema. There could be some atelectasis and minimal   pleural fluid as well.       This report was finalized on 10/1/2019 2:30 PM by Dr. Alfie Cruz M.D.              I ordered the above noted  "radiological studies and reviewed the images on the PACS system.        EKG    ekg was interpreted by Dr. Cuevas.      PROGRESS AND CONSULTS  1629 Reviewed pt's history and workup with Dr. Cuevas.   After a bedside evaluation; Dr. Cuevas agrees with the plan of care.      CONSULTS  Time: 1743  Discussed case with Dr. Garland (Cardiology)  Reviewed history, exam, results and treatments.  Discussed concerns and plan of care.  Dr. Garland deferred admission to medicine.    Time: 1750  Discussed case with Dr. Bobo ([facility])  Reviewed history, exam, results and treatments.  Discussed concerns and plan of care.  Dr. Bobo accepts pt to be admitted to tele obs for further evaluation and management.      COURSE & MEDICAL DECISION MAKING  Pertinent Labs and Imaging studies that were ordered and reviewed are noted above.  Results were reviewed/discussed with the patient and they were also made aware of online assess.   Pt also made aware that some labs, such as cultures, will not be resulted during ER visit and follow up with PMD is necessary.     MEDICATIONS GIVEN IN ER  Medications   sodium chloride 0.9 % flush 10 mL (not administered)   furosemide (LASIX) injection 40 mg (40 mg Intravenous Given 10/1/19 1714)       /75 (BP Location: Right arm, Patient Position: Sitting)   Pulse 63   Temp 98.9 °F (37.2 °C)   Resp 16   Ht 152.4 cm (60\")   SpO2 97%   BMI 25.78 kg/m²       ADMISSION TO TELE OBS    Discussed treatment plan and reason for admission with pt/family and admitting physician.  Pt/family voiced understanding of the plan for admission for further testing/treatment as needed.      DIAGNOSIS  Final diagnoses:   Acute on chronic combined systolic and diastolic CHF (congestive heart failure) (CMS/[facility])           Documentation assistance provided by audi [place] for JAYSHREE Hernandez.  Information recorded by the audi was done at my direction and has been verified and validated by me.        Dennys, " Sue  10/01/19 1757       Sophia Hill, JAYSHREE  10/01/19 9495

## 2019-10-01 NOTE — ED NOTES
"Pt's granddaughter states \"When I woke up this morning at 0630 she was standing in the doorway talking to people who weren't there. Throughout the morning she has been continuing to talk about things that don't make sense.\" Pt is A&O to self, place and time.     Carmelina Yoder, RN  10/01/19 9105    "

## 2019-10-02 PROBLEM — R41.0 DELIRIUM, ACUTE: Status: ACTIVE | Noted: 2019-01-01

## 2019-10-02 NOTE — PROGRESS NOTES
Name: Lindsay Martinez ADMIT: 10/1/2019   : 1930  PCP: Ronda Centeno MD    MRN: [id number] LOS: 0 days   AGE/SEX: 89 y.o. female  ROOM: [facility]     Subjective   Subjective   CC: confusion, shortness of breath  No acute events.  Dyspnea is much improved.  Taking PO.  Did not really sleep last night.  No CP/dyspnea/f/c/n/v/d.    Objective   Objective   Vital Signs  Temp:  [97.6 °F (36.4 °C)-98.7 °F (37.1 °C)] 97.6 °F (36.4 °C)  Heart Rate:  [60-94] 94  Resp:  [18] 18  BP: (140-171)/(60-85) 140/60  SpO2:  [90 %-97 %] 93 %  on  Flow (L/min):  [1] 1;   Device (Oxygen Therapy): room air  Body mass index is 26.52 kg/m².  Physical Exam   Constitutional: No distress.   HENT:   Head: Normocephalic and atraumatic.   Mouth/Throat: Oropharynx is clear and moist.   Eyes: Conjunctivae and EOM are normal. Pupils are equal, round, and reactive to light.   Neck: Normal range of motion. Neck supple. No JVD present.   Cardiovascular: Normal rate, regular rhythm and intact distal pulses.   Pulmonary/Chest: Effort normal. She has rales (right basilar).   Abdominal: Soft. Bowel sounds are normal.   Musculoskeletal: She exhibits edema (trace BLE). She exhibits no tenderness.   Neurological: She is alert. She is disoriented (self). No cranial nerve deficit.   Skin: Skin is warm and dry. She is not diaphoretic.   Psychiatric: She has a normal mood and affect. Her behavior is normal.   Nursing note and vitals reviewed.      Results Review:       I reviewed the patient's new clinical results.  Results from last 7 days   Lab Units 10/02/19  0655 10/01/19  1403   WBC 10*3/mm3 4.87 6.19   HEMOGLOBIN g/dL 11.4* 11.0*   PLATELETS 10*3/mm3 211 217     Results from last 7 days   Lab Units 10/02/19  0655 10/01/19  1403   SODIUM mmol/L 142 142   POTASSIUM mmol/L 3.7 4.6   CHLORIDE mmol/L 101 104   CO2 mmol/L 29.0 23.9   BUN mg/dL 23 21   CREATININE mg/dL 1.19* 1.11*   GLUCOSE mg/dL 131* 137*   Estimated Creatinine Clearance: 26.3 mL/min (A)  (by C-G formula based on SCr of 1.19 mg/dL (H)).  Results from last 7 days   Lab Units 10/02/19  0655 10/01/19  1403   ALBUMIN g/dL 4.10 4.10   BILIRUBIN mg/dL 0.6 0.5   ALK PHOS U/L 118* 113   AST (SGOT) U/L 13 16   ALT (SGPT) U/L 5 6     Results from last 7 days   Lab Units 10/02/19  0655 10/01/19  1403   CALCIUM mg/dL 9.1 8.8   ALBUMIN g/dL 4.10 4.10     Results from last 7 days   Lab Units 10/02/19  0655 10/01/19  1403   PROCALCITONIN ng/mL 0.07* 0.06*   LACTATE mmol/L 1.3  --      Hemoglobin A1C   Date/Time Value Ref Range Status   10/02/2019 0655 5.40 4.80 - 5.60 % Final     Glucose   Date/Time Value Ref Range Status   10/02/2019 1127 158 (H) 70 - 130 mg/dL Final   10/02/2019 0634 126 70 - 130 mg/dL Final   10/01/2019 2205 113 70 - 130 mg/dL Final         aspirin 81 mg Oral Daily   atorvastatin 10 mg Oral Daily   donepezil 5 mg Oral Daily   enoxaparin 30 mg Subcutaneous Q24H   ferrous sulfate 325 mg Oral Daily With Breakfast   [START ON 10/3/2019] furosemide 40 mg Oral Daily   insulin lispro 0-9 Units Subcutaneous 4x Daily With Meals & Nightly   magnesium oxide 400 mg Oral BID   melatonin 5 mg Oral Nightly   metoprolol succinate XL 12.5 mg Oral Daily   mirtazapine 7.5 mg Oral Nightly   potassium chloride 20 mEq Oral Daily   sodium chloride 10 mL Intravenous Q12H      Diet Regular; Cardiac, Consistent Carbohydrate, Renal       Assessment/Plan     Active Hospital Problems    Diagnosis  POA   • **Acute on chronic combined systolic and diastolic CHF (congestive heart failure) (CMS/HCC) [I50.43]  Yes   • Delirium, acute [R41.0]  Yes   • Hyperparathyroidism (CMS/HCC) [E21.3]  Yes   • Type 2 diabetes mellitus without complication, without long-term current use of insulin (CMS/HCC) [E11.9]  Yes   • Chronic venous insufficiency [I87.2]  Yes   • HTN (hypertension) [I10]  Yes      Resolved Hospital Problems   No resolved problems to display.   Acute on Chronic Combined systolic/diastolic CHF  - volume status looks much  better  - on oral diuretic  - monitor I&O and chemistries  - has right pleural effusion but otherwise appears euvolemic-will ask pulm to evaluate  - appreciate cardiology recs    Confusion  - likely delirium/sundowning exacerbated by dyspnea and hypoxemia from CHF  - will schedule melatonin and start PRN zyprexa  - lights on, drapes open during the day  - reorient as needed    Type 2 DM  - continue coverage with ssi/hypoglycemia     HTN  - BP is running high today  - stop midodrine    Lovenox 30 mg SC daily for DVT prophylaxis.  Full code.  Discussed with patient, family and nursing staff.  Anticipate discharge: TBD      Joel Griffin MD  [facility]  10/02/19  2:54 PM

## 2019-10-02 NOTE — H&P
Internal medicine history and physical  INTERNAL MEDICINE   [facility]       Patient Identification:  Name: Lindsay Martinez  Age: 89 y.o.  Sex: female  :  1930  MRN: [id number]                   Primary Care Physician: Ronda Centeno MD                                   Chief Complaint: Brought to the emergency room by the granddaughter for altered mental status, hallucination and confusion since 630 this morning associated with shortness of breath.    History of Present Illness:   Patient is a 89-year-old female with history of dementia, coronary artery disease hypertension and dyslipidemia as well as chronic venous insufficiency apparently had a permanent pacemaker placement about a week ago at [facility].  Patient does not remember who was her cardiologist and why the procedure was done.  Apparently she has done reasonably well since then until early morning today and the granddaughter noticed that patient is confused talking to people who are not there and acting differently.  She was also noted to be out of breath.  With these complaints patient was brought to the emergency room      Past Medical History:  Past Medical History:   Diagnosis Date   • Altered mental state    • Chronic venous insufficiency    • Encephalopathy acute    • Hyperlipidemia    • Hypertension    • Late onset Alzheimer's disease without behavioral disturbance (CMS/HCC)      Past Surgical History:  Past Surgical History:   Procedure Laterality Date   • EYE SURGERY     • HERNIA REPAIR     • HYSTERECTOMY     • REFRACTIVE SURGERY Right    • THYROID SURGERY  2018      Home Meds:    (Not in a hospital admission)  Current Meds:     Current Facility-Administered Medications:   •  [COMPLETED] Insert peripheral IV, , , Once **AND** sodium chloride 0.9 % flush 10 mL, 10 mL, Intravenous, PRN, Sophia Hill, APRN    Current Outpatient Medications:   •  aspirin 81 MG EC tablet, Take 81 mg by mouth Daily., Disp: ,  Rfl:   •  donepezil (ARICEPT) 5 MG tablet, Take 5 mg by mouth Daily., Disp: , Rfl: 0  •  furosemide (LASIX) 20 MG tablet, Take 1 tablet by mouth Daily. (Patient taking differently: Take 20 mg by mouth 2 (Two) Times a Day. 7 AM and 2 PM), Disp: , Rfl:   •  magnesium oxide (MAGOX) 400 (241.3 Mg) MG tablet tablet, Take 400 mg by mouth 2 (Two) Times a Day., Disp: , Rfl: 0  •  metoprolol succinate XL (TOPROL-XL) 25 MG 24 hr tablet, Take 12.5 mg by mouth Daily., Disp: , Rfl:   •  midodrine (PROAMATINE) 10 MG tablet, Take 10 mg by mouth 3 (Three) Times a Day. Do not give after 6PM, Disp: , Rfl:   •  mirtazapine (REMERON) 7.5 MG tablet, Take 7.5 mg by mouth Every Night., Disp: , Rfl: 0  •  potassium chloride (K-DUR,KLOR-CON) 20 MEQ CR tablet, Take 20 mEq by mouth Daily., Disp: , Rfl: 0  •  simvastatin (ZOCOR) 20 MG tablet, Take 20 mg by mouth Every Night., Disp: , Rfl:   •  ferrous sulfate (IRON SUPPLEMENT) 325 (65 FE) MG tablet, Take 1 tablet by mouth Daily With Breakfast. (Patient taking differently: Take 325 mg by mouth Daily With Breakfast. Filled for 30 days on 8/19. No refills given until patient is seen by her physician.), Disp: 30 tablet, Rfl: 0  Allergies:  Allergies   Allergen Reactions   • Latex Itching     Social History:   Social History     Tobacco Use   • Smoking status: Never Smoker   • Smokeless tobacco: Never Used   Substance Use Topics   • Alcohol use: No     Frequency: Never      Family History:  Family History   Problem Relation Age of Onset   • Diabetes Mother    • Hypertension Mother    • Heart disease Mother    • Cancer Father    • Cancer Daughter           Review of Systems  See history of present illness and past medical history.  Patient is very apologetic as she cannot think of the details of her symptoms.  Unable to perform ROS: Mental status change   Constitutional: Negative for chills and fever.   Respiratory: Positive for cough (mild) and shortness of breath (worsening of chronic).   "  Cardiovascular: Negative for chest pain.   Gastrointestinal: Negative for abdominal pain and vomiting.   Psychiatric/Behavioral: Positive for confusion and hallucinations (auditory and visual).   Remainder of ROS is negative.      Vitals:   /74 (BP Location: Right arm, Patient Position: Sitting)   Pulse 65   Temp 98.9 °F (37.2 °C)   Resp 16   Ht 152.4 cm (60\")   SpO2 95%   BMI 25.78 kg/m²   I/O: No intake or output data in the 24 hours ending 10/01/19 2050  Exam:  General Appearance:    Alert, cooperative, and frustrated because of her inability to recall details and the reasons why she is here.   Head:    Normocephalic, without obvious abnormality, atraumatic   Eyes:    PERRL, conjunctiva/corneas clear, EOM's intact, both eyes   Ears:    Normal external ear canals, both ears   Nose:   Nares normal, septum midline, mucosa normal, no drainage    or sinus tenderness   Throat:   Lips, tongue, gums normal; oral mucosa pink and moist   Neck:   Supple, symmetrical, trachea midline, no adenopathy;     thyroid:  no enlargement/tenderness/nodules; no carotid    bruit or JVD   Back:     Symmetric, no curvature, ROM normal, no CVA tenderness   Lungs:    Decreased breath sounds at the bases no obvious use of accessory muscles of breathing noted   Chest Wall:   Left upper chest permanent pacemaker site well approximated with expected bruising.  Left arm is in sling.    Heart:    Regular rate and rhythm, S1 and S2 normal, no murmur, rub   or gallop   Abdomen:     Soft, non-tender, bowel sounds active all four quadrants,     no masses, no hepatomegaly, no splenomegaly   Extremities:   Extremities normal, atraumatic, no cyanosis or edema   Pulses:   Pulses palpable in all extremities; symmetric all extremities   Skin:   Skin color normal, Skin is warm and dry,  no rashes or palpable lesions   Neurologic:  Grossly nonfocal with obvious inability to recall events.       Data Review:      I reviewed the patient's new " clinical results.  Results from last 7 days   Lab Units 10/01/19  1403   WBC 10*3/mm3 6.19   HEMOGLOBIN g/dL 11.0*   PLATELETS 10*3/mm3 217     Results from last 7 days   Lab Units 10/01/19  1403   SODIUM mmol/L 142   POTASSIUM mmol/L 4.6   CHLORIDE mmol/L 104   CO2 mmol/L 23.9   BUN mg/dL 21   CREATININE mg/dL 1.11*   CALCIUM mg/dL 8.8   GLUCOSE mg/dL 137*     ECG 12 Lead   Final Result   HEART RATE= 68  bpm   RR Interval= 888  ms   CO Interval= 277  ms   P Horizontal Axis= 36  deg   P Front Axis=   deg   QRSD Interval= 90  ms   QT Interval= 442  ms   QRS Axis= 59  deg   T Wave Axis= 33  deg   - ABNORMAL ECG -   Atrial-paced complexes   Prolonged CO interval   Anteroseptal infarct, age indeterminate   NO PRIOR TRACING AVAILABLE FOR COMPARISON   Electronically Signed By: Shanna Paiz ([facility]) 01-Oct-2019 17:09:50   Date and Time of Study: 2019-10-01 13:48:24      Xr Chest 2 View    Result Date: 10/1/2019  1. Mild cardiomegaly. 2. Bilateral interstitial fluid is consistent with pulmonary edema. 3. Alveolar fluid in the right lung base may represent pneumonia or asymmetric pulmonary edema. There could be some atelectasis and minimal pleural fluid as well.  This report was finalized on 10/1/2019 2:30 PM by Dr. Alfie Cruz M.D.      SCANNED EKG   Final Result      ECG 12 Lead   Final Result   HEART RATE= 68  bpm   RR Interval= 888  ms   CO Interval= 277  ms   P Horizontal Axis= 36  deg   P Front Axis=   deg   QRSD Interval= 90  ms   QT Interval= 442  ms   QRS Axis= 59  deg   T Wave Axis= 33  deg   - ABNORMAL ECG -   Atrial-paced complexes   Prolonged CO interval   Anteroseptal infarct, age indeterminate   NO PRIOR TRACING AVAILABLE FOR COMPARISON   Electronically Signed By: Shanna Paiz ([facility]) 01-Oct-2019 17:09:50   Date and Time of Study: 2019-10-01 13:48:24        · Her BNP is 15,604  · Troponin less than 0.010  · Urinalysis essentially unremarkable  · Procalcitonin less than 0.06  Assessment:  [facility]  Problems    Diagnosis POA   • **Acute on chronic combined systolic and diastolic CHF (congestive heart failure) (CMS/[facility]) [I50.43] Yes   • Hyperparathyroidism (CMS/[facility]) [E21.3] Yes   • Type 2 diabetes mellitus without complication, without long-term current use of insulin (CMS/[facility]) [E11.9] Yes   • Chronic venous insufficiency [I87.2] Yes   • HTN (hypertension) [I10] Yes       Medical decision making:  Acute confusion and disorientation and altered mental status likely reactive encephalopathy due to recent procedure as she has exhibited similar presentation and hospitalization in 2018 after ophthalmology surgery-plan is to monitor provide her with fall precautions and periodic assessments.  Will check CT scan of her head and may consider neurology evaluation.  Abnormal chest x-ray with question raised for possibility of evolving pneumonia-we will check procalcitonin level provided with oxygen supplementation and check respiratory viral panel.  Consider empiric antibiotic treatment after blood culture if she spikes temperature or if the procalcitonin is elevated.  Exacerbation of congestive heart failure-admit the patient provide with diuretics cardiology consultation after getting more information about who is patient's cardiologist is.  History of permanent pacemaker placement/AICD placement details unclear.  Apparently procedure was performed about a week ago at [facility] plan is to get for more information and manage accordingly.  Diabetes mellitus-continue with Accu-Cheks and sliding scale coverage check hemoglobin A1c.  Hypertension-continue her antihypertensive regimen.  Patient recently has a blood pressure medicine adjusted and there is some documentation that she may have history of orthostatic hypotension.  Limited database require further information.      Rosa Bobo MD   10/1/2019  8:50 PM  Much of this encounter note is an electronic transcription/translation of spoken language to printed text.  The electronic translation of spoken language may permit erroneous, or at times, nonsensical words or phrases to be inadvertently transcribed; Although I have reviewed the note for such errors, some may still exist

## 2019-10-02 NOTE — CONSULTS
[facility]  Phone: [phone]  Familia Li MD      Subjective   LOS: 0 days     Thank you for this consultation.  89-year-old female who has had recent cardiac events.  She was being evaluated at [facility] approximately 1 month ago.  She had a cardiac arrest that.  She was confused thereafter for several days.  Family states this happens with any illness.  This past week she was at [facility] for pacemaker placement.  When she left the hospital she was apparently in her usual mental state.  Family noticed increasing shortness of breath cough and confusion.  She was brought to the emergency room here.  She has been seen by cardiology.  She has evidence for effusion on her chest x-ray.  We have been asked to assess.  Family and patient state that she does not have any dysphagia.  She has not had any choking or coughing while eating food.  No previous stroke.  She is never been a smoker.  She has no prior history of lung disease.  She does have significant heart disease.  She also tends to get very confused with daily illness and it takes her several days to get out of that.  She has had a non-ST elevation MI in 2018.  She has coronary artery disease.  Recent permanent pacemaker.  She has recorded history of Alzheimer's dementia.    Lindsay Martinez  reports that she does not drink alcohol.,  reports that she has never smoked. She has never used smokeless tobacco.     Past Hx:  has a past medical history of Altered mental state, Chronic venous insufficiency, Encephalopathy acute, Hyperlipidemia, Hypertension, and Late onset Alzheimer's disease without behavioral disturbance (CMS/HCC).  Surg Hx:  has a past surgical history that includes Hysterectomy; Thyroid surgery (2018); Hernia repair; Refractive surgery (Right); and Eye surgery.  FH: family history includes Cancer in her daughter and father; Diabetes in her mother; Heart disease in her mother; Hypertension in her mother.  SH:  reports that she  has never smoked. She has never used smokeless tobacco. She reports that she does not drink alcohol or use drugs.    Medications Prior to Admission   Medication Sig Dispense Refill Last Dose   • aspirin 81 MG EC tablet Take 81 mg by mouth Daily.   10/1/2019 at Unknown time   • donepezil (ARICEPT) 5 MG tablet Take 5 mg by mouth Daily.  0 9/30/2019 at Unknown time   • ferrous sulfate (IRON SUPPLEMENT) 325 (65 FE) MG tablet Take 1 tablet by mouth Daily With Breakfast. (Patient taking differently: Take 325 mg by mouth Daily With Breakfast. Filled for 30 days on 8/19. No refills given until patient is seen by her physician.) 30 tablet 0 Past Month at Unknown time   • furosemide (LASIX) 20 MG tablet Take 1 tablet by mouth Daily. (Patient taking differently: Take 20 mg by mouth Daily. 7 AM and 2 PM)   10/1/2019 at Unknown time   • magnesium oxide (MAGOX) 400 (241.3 Mg) MG tablet tablet Take 400 mg by mouth 2 (Two) Times a Day.  0 10/1/2019 at Unknown time   • melatonin 5 MG tablet tablet Take 10 mg by mouth At Night As Needed.   9/30/2019 at Unknown time   • metoprolol succinate XL (TOPROL-XL) 25 MG 24 hr tablet Take 12.5 mg by mouth Daily.   10/1/2019 at Unknown time   • midodrine (PROAMATINE) 10 MG tablet Take 10 mg by mouth 3 (Three) Times a Day. Do not give after 6PM   10/1/2019 at Unknown time   • mirtazapine (REMERON) 7.5 MG tablet Take 7.5 mg by mouth Every Night.  0 9/30/2019 at Unknown time   • potassium chloride (K-DUR,KLOR-CON) 20 MEQ CR tablet Take 20 mEq by mouth Daily.  0 10/1/2019 at Unknown time   • simvastatin (ZOCOR) 20 MG tablet Take 20 mg by mouth Every Night.   9/30/2019 at Unknown time     Allergies   Allergen Reactions   • Latex Itching       Review of Systems   Constitutional: Negative for chills and fever.   HENT: Negative for congestion, postnasal drip and trouble swallowing.    Respiratory: Positive for shortness of breath. Negative for cough and wheezing.    Cardiovascular: Negative for chest  pain and leg swelling.   Gastrointestinal: Negative for abdominal pain, diarrhea, nausea and vomiting.   Endocrine: Negative for cold intolerance and heat intolerance.   Genitourinary: Negative for frequency and urgency.   Musculoskeletal: Negative for arthralgias and back pain.   Skin: Negative for pallor and rash.   Neurological: Negative for seizures and headaches.   Psychiatric/Behavioral: Positive for confusion. The patient is not nervous/anxious.    Review of systems from patient may not be reliable.  Elements were confirmed with family members.    Vital Signs past 24hrs  BP range: BP: (140-171)/(60-85) 140/60  Pulse range: Heart Rate:  [65-94] 94  Resp rate range: Resp:  [18] 18  Temp range: Temp (24hrs), Av °F (36.7 °C), Min:97.6 °F (36.4 °C), Max:98.7 °F (37.1 °C)    Oxygen range: SpO2:  [90 %-95 %] 93 %;  ;   Device (Oxygen Therapy): room air  61.6 kg (135 lb 12.5 oz); Body mass index is 26.52 kg/m².  No intake/output data recorded.    Adult female who is laying in bed.  Looks about her stated age.  Pupils equal and react light.  Oropharynx moist class II Mallampati airway.  No posterior pharyngeal discharge.  Nasopharynx without discharge septum midline.  JVP not elevated trachea midline thyroid not enlarged.  Lungs reveal diminished breath sounds right lung base absent in the right lung field in the lung base.  Percussion is dull in the right lung base one third up.  Rest of lung fields sounds clear.  No wheezing or any apparent rales.  Chest expansion equal no chest wall deformity or tenderness.  Heart examination S1-S2 present rhythm regular and based on the monitors.  Murmur is present.  No edema in lower extremities.  Abdomen is soft nontender bowel sounds present no liver spleen enlargement.  No peripheral cyanosis clubbing.  Moves all 4 extremities.  Patient is alert but not oriented.  No cervical, axillary, inguinal adenopathy.    Results Review:    I have reviewed the laboratory and imaging  data from current admission. My annotations are as noted in assessment and plan.    Medication Review:  I have reviewed the current MAR. My annotations are as noted in assessment and plan.    Plan   PCCM Problems  Right lower lobe infiltrate  Right lower lobe effusion  Recent cardiac events  Confusion and disorientation  Perhaps baseline dementia  Relevant Medical Diagnoses  CAD  Recent pacemaker  Recent cardiac arrest    Plan of Treatment  CT chest to review further.  Suspect she may have aspirated during pacemaker placement.  Other causes for pleural effusion are obviously possible.  Does not appear infected at this time.  Procalcitonin is normal making infection less likely.  Spoke to family and discussed possible treatment plan.    Part of this note may be an electronic transcription/translation of spoken language to printed text using the Dragon Dictation System.

## 2019-10-02 NOTE — DISCHARGE PLACEMENT REQUEST
"Lindsay Street (89 y.o. Female)     Date of Birth Social Security Number Address Home Phone MRN    1930  [facility] [address] [address] [phone] [id number]    [employer] Marital Status          Scientology        Admission Date Admission Type Admitting Provider Attending Provider Department, Room/Bed    10/1/19 Emergency Rosa Bobo MD Lykins, Matthew D, MD [address], [address]    Discharge Date Discharge Disposition Discharge Destination                       Attending Provider:  Joel Griffin MD    Allergies:  Latex    Isolation:  None   Infection:  None   Code Status:  CPR    Ht:  152.4 cm (60\")   Wt:  61.6 kg (135 lb 12.5 oz)    Admission Cmt:  None   Principal Problem:  Acute on chronic combined systolic and diastolic CHF (congestive heart failure) (CMS/HCC) [I50.43]                 Active Insurance as of 10/1/2019     Primary Coverage     Payor Plan Insurance Group Employer/Plan Group    MEDICARE RAILROAD MEDICARE      Payor Plan Address Payor Plan Phone Number Payor Plan Fax Number Effective Dates    [address] [phone]  1995 - None Entered    [facility] [id number]       Subscriber Name Subscriber Birth Date Member ID       [address] 1930 [id number]           Secondary Coverage     Payor Plan Insurance Group Employer/Plan Group    [facility][1] SUPP [id number]     Payor Plan Address Payor Plan Phone Number Payor Plan Fax Number Effective Dates    [address]   2016 - None Entered    [facility] [id number]       Subscriber Name Subscriber Birth Date Member ID       [address] 1930 [id number]                 Emergency Contacts      (Rel.) Home Phone Work Phone Mobile Phone    LEANDRO MORILLO (Daughter) -- -- [phone]    ADITHYA STREET (Son) -- -- [phone]              "

## 2019-10-02 NOTE — PLAN OF CARE
Problem: Patient Care Overview  Goal: Plan of Care Review  Outcome: Ongoing (interventions implemented as appropriate)   10/02/19 6101   Coping/Psychosocial   Plan of Care Reviewed With patient;grandchild(amanda)   OTHER   Outcome Summary VSS, on room air; a paced on demand; asst x1 to bathroom, IV lasix changed to PO, per cardio pt weight is about the same, possible sleep apnea, overnight oximetry ordered; pt has voided several times today; CT head completed, no evidence of stroke; BP on high side, midodrine stopped; will continue to monitor   Plan of Care Review   Progress improving     Goal: Individualization and Mutuality  Outcome: Ongoing (interventions implemented as appropriate)    Goal: Discharge Needs Assessment  Outcome: Ongoing (interventions implemented as appropriate)    Goal: Interprofessional Rounds/Family Conf  Outcome: Ongoing (interventions implemented as appropriate)      Problem: Fall Risk (Adult)  Goal: Absence of Fall  Outcome: Ongoing (interventions implemented as appropriate)      Problem: Skin Injury Risk (Adult)  Goal: Skin Health and Integrity  Outcome: Ongoing (interventions implemented as appropriate)

## 2019-10-02 NOTE — PLAN OF CARE
Problem: Patient Care Overview  Goal: Plan of Care Review  Outcome: Ongoing (interventions implemented as appropriate)   10/02/19 0429   Coping/Psychosocial   Plan of Care Reviewed With patient   OTHER   Outcome Summary Admitted to 4E from ER. VSS on RA. No c/o pain or SOA. Safety maintained. Continune to monitor.     Goal: Individualization and Mutuality  Outcome: Ongoing (interventions implemented as appropriate)    Goal: Discharge Needs Assessment  Outcome: Ongoing (interventions implemented as appropriate)    Goal: Interprofessional Rounds/Family Conf  Outcome: Ongoing (interventions implemented as appropriate)      Problem: Fall Risk (Adult)  Goal: Identify Related Risk Factors and Signs and Symptoms  Outcome: Outcome(s) achieved Date Met: 10/02/19    Goal: Absence of Fall  Outcome: Outcome(s) achieved Date Met: 10/02/19      Problem: Skin Injury Risk (Adult)  Goal: Identify Related Risk Factors and Signs and Symptoms  Outcome: Outcome(s) achieved Date Met: 10/02/19    Goal: Skin Health and Integrity  Outcome: Ongoing (interventions implemented as appropriate)

## 2019-10-02 NOTE — PROGRESS NOTES
Discharge Planning Assessment  [facility]     Patient Name: Lindsay Martinez  MRN: [id number]  Today's Date: 10/2/2019    Admit Date: 10/1/2019    Discharge Needs Assessment     Row Name 10/02/19 1109       Living Environment    Lives With  grandchild(amanda)    Current Living Arrangements  home/apartment/condo    Primary Care Provided by  self    Provides Primary Care For  no one    Family Caregiver if Needed  grandchild(amanda), adult    Family Caregiver Names  Jennifer    Quality of Family Relationships  helpful;involved;supportive    Able to Return to Prior Arrangements  yes       Resource/Environmental Concerns    Resource/Environmental Concerns  home accessibility    Home Accessibility Concerns  stairs to enter home       Transition Planning    Patient/Family Anticipates Transition to  home with family    Patient/Family Anticipated Services at Transition  none    Transportation Anticipated  family or friend will provide       Discharge Needs Assessment    Readmission Within the Last 30 Days  no previous admission in last 30 days    Equipment Currently Used at Home  cane, straight    Outpatient/Agency/Support Group Needs  homecare agency    Discharge Facility/Level of Care Needs  home with home health    Offered/Gave Vendor List  no current with [facility]    Discharge Coordination/Progress  home with [facility]        Discharge Plan     Row Name 10/02/19 1111       Plan    Plan  home with [facility];  left for [facility]     Patient/Family in Agreement with Plan  yes granddaughter Jennifer    Plan Comments  Spoke with pt's granddaughter Jennifer [phone]  via phone as pt is confused. Confirmed facesheet correct. Explained role of CCP. Pt lives with Jennifer in a single level house with 7 steps to enter. Pt uses a cane to ambulate if needed. Pt's PCP and pharmacy ([facility] on [address]) are correct. Pt is current with [facility]; [facility] left for [facility]. Pt has been to SNF but Jennifer is unsure what facility as it was 3 years ago. Jennifer reports pt is  not confused at baseline, this is new. She anticipates pt coming with [facility] .  CCP to follow…Jamila CSW        Destination      No service coordination in this encounter.      Durable Medical Equipment      No service coordination in this encounter.      Dialysis/Infusion      No service coordination in this encounter.      Home Medical Care      Service Provider Request Status Selected Services Address Phone Number Fax Number    [facility]A [address] [place] [id number] [phone]      Therapy      No service coordination in this encounter.      Community Resources      No service coordination in this encounter.          Demographic Summary     Row Name 10/02/19 1108       General Information    Admission Type  observation    Arrived From  home    Reason for Consult  discharge planning    Preferred Language  English     Used During This Interaction  no       Contact Information    Permission Granted to Share Info With  facility ;family/designee        Functional Status     Row Name 10/02/19 1109       Functional Status    Usual Activity Tolerance  good    Current Activity Tolerance  moderate       Functional Status, IADL    Medications  independent;assistive person    Meal Preparation  assistive person;independent    Housekeeping  assistive person    Laundry  assistive person    Shopping  assistive person       Mental Status    General Appearance WDL  WDL       Mental Status Summary    Recent Changes in Mental Status/Cognitive Functioning  decision making/judgment;thought patterns        Psychosocial    No documentation.       Abuse/Neglect    No documentation.       Legal    No documentation.       Substance Abuse    No documentation.       Patient Forms    No documentation.           NESSA Arambula

## 2019-10-03 NOTE — PROGRESS NOTES
Lindsay Martinez   89 y.o.  female    LOS: 1 day   Patient Care Team:  Ronda Centeno MD as PCP - General (Family Medicine)      Subjective   Confused  Interval History:     Patient Complaints:     Review of Systems:       Medication Review:   Current Facility-Administered Medications:   •  acetaminophen (TYLENOL) tablet 650 mg, 650 mg, Oral, Q4H PRN **OR** acetaminophen (TYLENOL) 160 MG/5ML solution 650 mg, 650 mg, Oral, Q4H PRN **OR** acetaminophen (TYLENOL) suppository 650 mg, 650 mg, Rectal, Q4H PRN, Rosa Bobo MD  •  aspirin EC tablet 81 mg, 81 mg, Oral, Daily, Rosa Bobo MD, 81 mg at 10/03/19 0958  •  atorvastatin (LIPITOR) tablet 10 mg, 10 mg, Oral, Daily, Rosa Bobo MD, 10 mg at 10/03/19 0957  •  dextrose (D50W) 25 g/ 50mL Intravenous Solution 25 g, 25 g, Intravenous, Q15 Min PRN, Rosa Bobo MD  •  dextrose (GLUTOSE) oral gel 15 g, 15 g, Oral, Q15 Min PRN, Rosa Bobo MD  •  donepezil (ARICEPT) tablet 5 mg, 5 mg, Oral, Daily, Rosa Bobo MD, 5 mg at 10/03/19 0958  •  enoxaparin (LOVENOX) syringe 30 mg, 30 mg, Subcutaneous, Q24H, Rosa Bobo MD, 30 mg at 10/02/19 2010  •  ferrous sulfate tablet 325 mg, 325 mg, Oral, Daily With Breakfast, Rosa Bobo MD, 325 mg at 10/03/19 0957  •  furosemide (LASIX) tablet 40 mg, 40 mg, Oral, Daily, Tri Valentino, APRN, 40 mg at 10/03/19 0957  •  glucagon (human recombinant) (GLUCAGEN DIAGNOSTIC) injection 1 mg, 1 mg, Subcutaneous, Q15 Min PRN, Rosa Bobo MD  •  insulin lispro (humaLOG) injection 0-9 Units, 0-9 Units, Subcutaneous, 4x Daily With Meals & Nightly, Rosa Bobo MD, 2 Units at 10/03/19 0956  •  magnesium oxide (MAG-OX) tablet 400 mg, 400 mg, Oral, BID, Rosa Bobo MD, 400 mg at 10/03/19 0957  •  melatonin tablet 5 mg, 5 mg, Oral, Nightly, Joel Griffin MD, 5 mg at 10/02/19 2009  •  metoprolol succinate XL (TOPROL-XL) 24 hr tablet 12.5 mg, 12.5 mg, Oral, Daily, Rosa Bobo MD, 12.5 mg at 10/03/19 0959  •  mirtazapine (REMERON)  tablet 7.5 mg, 7.5 mg, Oral, Nightly, Rosa Bobo MD, 7.5 mg at 10/02/19 2009  •  nitroglycerin (NITROSTAT) SL tablet 0.4 mg, 0.4 mg, Sublingual, Q5 Min PRN, Rosa Bobo MD  •  OLANZapine (zyPREXA) tablet 5 mg, 5 mg, Oral, Nightly PRN, Joel Griffin MD, 5 mg at 10/03/19 0010  •  ondansetron (ZOFRAN) injection 4 mg, 4 mg, Intravenous, Q6H PRN, Rosa Bobo MD  •  potassium chloride (MICRO-K) CR capsule 20 mEq, 20 mEq, Oral, Daily, Joel Griffin MD  •  [COMPLETED] Insert peripheral IV, , , Once **AND** sodium chloride 0.9 % flush 10 mL, 10 mL, Intravenous, PRN, Sophia Hill APRN  •  sodium chloride 0.9 % flush 10 mL, 10 mL, Intravenous, Q12H, Rosa Bobo MD, 10 mL at 10/03/19 1002  •  sodium chloride 0.9 % flush 10 mL, 10 mL, Intravenous, PRN, Rosa Bobo MD      Objective   Vital Sign Min/Max for last 24 hours  Temp  Min: 97.6 °F (36.4 °C)  Max: 98.3 °F (36.8 °C)   BP  Min: 140/60  Max: 160/74    Pulse  Min: 69  Max: 94     Wt Readings from Last 3 Encounters:   10/03/19 59.5 kg (131 lb 1.6 oz)   08/19/19 59.9 kg (132 lb)   07/30/19 62.2 kg (137 lb 3.2 oz)        Intake/Output Summary (Last 24 hours) at 10/3/2019 1202  Last data filed at 10/3/2019 0500  Gross per 24 hour   Intake 360 ml   Output 300 ml   Net 60 ml     Physical Exam:      General Appearance:    Well developed and well nourished in no acute distress   Head:    Normocephalic, atraumatic   Eyes:            Conjunctivae normal, non icteric, no xanthelasma   Neck:   supple, trachea midline, no thyromegaly, no carotid bruit, no JVD, no elevated CVP   Lungs:     Clear to auscultation,respirations regular, even and                  unlabored    Heart:    Regular rhythm and normal rate, normal S1 and S2,            No murmur, no gallop, no rub, no click   Chest Wall:    No abnormalities observed   Abdomen:     Normal bowel sounds, no masses, no organomegaly, soft        non-tender, non-distended, no guarding, no rebound                 tenderness   Rectal:     Deferred   Extremities:   No edema. Moves all extremities well, no cyanosis, no erythema   Pulses:   Pulses palpable and equal bilaterally   Skin:   No bleeding, bruising or rash   Neurologic:   awake alert and oriented x3, speech clear and approp, no facial drooping     :    Monitor:      Results Review:         Sodium Sodium   Date Value Ref Range Status   10/02/2019 142 136 - 145 mmol/L Final   10/01/2019 142 136 - 145 mmol/L Final      Potassium Potassium   Date Value Ref Range Status   10/02/2019 3.7 3.5 - 5.2 mmol/L Final   10/01/2019 4.6 3.5 - 5.2 mmol/L Final      Chloride Chloride   Date Value Ref Range Status   10/02/2019 101 98 - 107 mmol/L Final   10/01/2019 104 98 - 107 mmol/L Final      Bicarbonate No results found for: PLASMABICARB   BUN BUN   Date Value Ref Range Status   10/02/2019 23 8 - 23 mg/dL Final   10/01/2019 21 8 - 23 mg/dL Final      Creatinine Creatinine   Date Value Ref Range Status   10/02/2019 1.19 (H) 0.57 - 1.00 mg/dL Final   10/01/2019 1.11 (H) 0.57 - 1.00 mg/dL Final      Calcium Calcium   Date Value Ref Range Status   10/02/2019 9.1 8.6 - 10.5 mg/dL Final   10/01/2019 8.8 8.6 - 10.5 mg/dL Final      Magnesium No results found for: MG     Results from last 7 days   Lab Units 10/02/19  0655   WBC 10*3/mm3 4.87   HEMOGLOBIN g/dL 11.4*   HEMATOCRIT % 36.2   PLATELETS 10*3/mm3 211     Lab Results   Lab Value Date/Time    TROPONINT <0.010 10/01/2019 1403     No results found for: CHOL  Lab Results   Component Value Date    HDL 58 07/30/2019    HDL 43 (L) 03/26/2018     Lab Results   Component Value Date    LDL 89 07/30/2019    LDL 65 03/26/2018     Lab Results   Component Value Date    TRIG 123 07/30/2019    TRIG 56 03/26/2018     No components found for: CHOLHDL  No results found for: PTT  No components found for: PT/INR  Lab Results   Component Value Date    HGBA1C 5.40 10/02/2019      Lab Results   Component Value Date    TSH 0.577 03/27/2018        Echo EF  Estimated  No results found for: ECHOEFEST      Assessment/ Plan    Active Hospital Problems    Diagnosis POA   • **Acute on chronic combined systolic and diastolic CHF (congestive heart failure) (CMS/[facility]) [I50.43] Yes   • Delirium, acute [R41.0] Yes   • Hyperparathyroidism (CMS/[facility]) [E21.3] Yes   • Type 2 diabetes mellitus without complication, without long-term current use of insulin (CMS/[facility]) [E11.9] Yes   • Chronic venous insufficiency [I87.2] Yes   • HTN (hypertension) [I10] Yes     Acute diastolic chf  ECHO: March 26, 2018: at [facility]  EF 61%, moderate to severe left atrial enlargement, mild to moderate mitral stenosis,              Dementia  Conservative treatment      Nithin Pedro MD  10/03/19  12:02 PM

## 2019-10-03 NOTE — PROGRESS NOTES
[facility]  [phone]  Familia Li MD    Subjective:  LOS: 1    She complains of some shortness of breath still.  She remains confused but calm and cooperative.  No distress or pain.    Objective   Vital Signs past 24hrs    Temp range: Temp (24hrs), Av.8 °F (36.6 °C), Min:97.6 °F (36.4 °C), Max:98.3 °F (36.8 °C)    BP range: BP: (140-160)/(60-88) 151/88  Pulse range: Heart Rate:  [69-94] 76  Resp rate range: Resp:  [16-18] 16    Device (Oxygen Therapy): room air   Oxygen range:SpO2:  [93 %-95 %] 94 %      59.5 kg (131 lb 1.6 oz); Body mass index is 25.6 kg/m².    Intake/Output Summary (Last 24 hours) at 10/3/2019 1252  Last data filed at 10/3/2019 0500  Gross per 24 hour   Intake 360 ml   Output 300 ml   Net 60 ml       Physical Exam   Constitutional: She appears well-developed.   HENT:   Head: Normocephalic.   Cardiovascular: Normal rate and regular rhythm.   No murmur heard.  Pulmonary/Chest: Effort normal. No respiratory distress. She has decreased breath sounds (right lower lung 1/2). She has no wheezes. She has no rales.   Dull to percuss RLL 1/2   Abdominal: Soft. Bowel sounds are normal. She exhibits no mass. There is no tenderness.   Musculoskeletal: She exhibits no edema.   Skin: No rash noted.     Results Review:    I have reviewed the laboratory and imaging data since the last note by [facility] physician.  My annotations are noted in assessment and plan.    Medication Review:  I have reviewed the current MAR.  My annotations are noted in assessment and plan.       Plan   PCCM Problems  Right lower lobe infiltrate  Right lower lobe effusion  Recent cardiac events  Confusion and disorientation  Perhaps baseline dementia  Relevant Medical Diagnoses  CAD  Recent pacemaker  Recent cardiac arrest    Plan of Treatment  Fairly large right pleural effusion with some adjacent atelectasis and probably not representing pneumonia.  Daughter reports that a tap was performed at [place] a few weeks  ago.  Unclear if it was on left or on the right.  In any case there is been a rapid reaccumulation.  We will schedule another tap.  If patient continues to redevelop pleural effusions secondary to heart failure a consideration would be placement of a Pleurx catheter.  Discussed with the daughter and patient and both seem agreeable to the tap.  We will schedule for tomorrow or depending on radiology availability.    Familia Li MD  10/03/19  12:52 PM    Part of this note may be an electronic transcription/translation of spoken language to printed text using the Dragon Dictation System.

## 2019-10-03 NOTE — PROGRESS NOTES
Name: Lindsay Martinez ADMIT: 10/1/2019   : 1930  PCP: Ronda Centeno MD    MRN: [id number] LOS: 1 days   AGE/SEX: 89 y.o. female  ROOM: [facility]     Subjective   Subjective   no new complaints, resting in bed comfortably    denies chest pain, palpitations, SOA, fever, chills, nausea and vomiting        Objective   Objective   Vital Signs  Temp:  [97.6 °F (36.4 °C)-98.3 °F (36.8 °C)] 97.6 °F (36.4 °C)  Heart Rate:  [69-94] 84  Resp:  [16-18] 16  BP: (140-160)/(60-77) 150/62  SpO2:  [93 %-95 %] 94 %  on   ;   Device (Oxygen Therapy): room air  Body mass index is 25.6 kg/m².  Physical Exam   Constitutional: She appears well-developed and well-nourished. No distress.   HENT:   Head: Normocephalic and atraumatic.   Cardiovascular: Normal rate and regular rhythm.   Murmur heard.  paced   Pulmonary/Chest: Effort normal. No respiratory distress. She has rales.   Musculoskeletal: Normal range of motion. She exhibits no edema.   Neurological: She is alert. She is disoriented.   Psychiatric: She has a normal mood and affect. Her behavior is normal.   Nursing note and vitals reviewed.      Results Review:       I reviewed the patient's new clinical results.  Results from last 7 days   Lab Units 10/02/19  0655 10/01/19  1403   WBC 10*3/mm3 4.87 6.19   HEMOGLOBIN g/dL 11.4* 11.0*   PLATELETS 10*3/mm3 211 217     Results from last 7 days   Lab Units 10/02/19  0655 10/01/19  1403   SODIUM mmol/L 142 142   POTASSIUM mmol/L 3.7 4.6   CHLORIDE mmol/L 101 104   CO2 mmol/L 29.0 23.9   BUN mg/dL 23 21   CREATININE mg/dL 1.19* 1.11*   GLUCOSE mg/dL 131* 137*   Estimated Creatinine Clearance: 25.9 mL/min (A) (by C-G formula based on SCr of 1.19 mg/dL (H)).  Results from last 7 days   Lab Units 10/02/19  0655 10/01/19  1403   ALBUMIN g/dL 4.10 4.10   BILIRUBIN mg/dL 0.6 0.5   ALK PHOS U/L 118* 113   AST (SGOT) U/L 13 16   ALT (SGPT) U/L 5 6     Results from last 7 days   Lab Units 10/02/19  0655 10/01/19  1403   CALCIUM mg/dL 9.1  8.8   ALBUMIN g/dL 4.10 4.10     Results from last 7 days   Lab Units 10/02/19  0655 10/01/19  1403   PROCALCITONIN ng/mL 0.07* 0.06*   LACTATE mmol/L 1.3  --      Hemoglobin A1C   Date/Time Value Ref Range Status   10/02/2019 0655 5.40 4.80 - 5.60 % Final     Glucose   Date/Time Value Ref Range Status   10/03/2019 0620 172 (H) 70 - 130 mg/dL Final   10/02/2019 2022 141 (H) 70 - 130 mg/dL Final   10/02/2019 1625 111 70 - 130 mg/dL Final   10/02/2019 1127 158 (H) 70 - 130 mg/dL Final   10/02/2019 0634 126 70 - 130 mg/dL Final   10/01/2019 2205 113 70 - 130 mg/dL Final         aspirin 81 mg Oral Daily   atorvastatin 10 mg Oral Daily   donepezil 5 mg Oral Daily   enoxaparin 30 mg Subcutaneous Q24H   ferrous sulfate 325 mg Oral Daily With Breakfast   furosemide 40 mg Oral Daily   insulin lispro 0-9 Units Subcutaneous 4x Daily With Meals & Nightly   magnesium oxide 400 mg Oral BID   melatonin 5 mg Oral Nightly   metoprolol succinate XL 12.5 mg Oral Daily   mirtazapine 7.5 mg Oral Nightly   potassium chloride 20 mEq Oral Daily   sodium chloride 10 mL Intravenous Q12H      Diet Regular; Cardiac, Consistent Carbohydrate, Renal       Assessment/Plan     Active Hospital Problems    Diagnosis  POA   • **Acute on chronic combined systolic and diastolic CHF (congestive heart failure) (CMS/HCC) [I50.43]  Yes   • Delirium, acute [R41.0]  Yes   • Hyperparathyroidism (CMS/[facility]) [E21.3]  Yes   • Type 2 diabetes mellitus without complication, without long-term current use of insulin (CMS/[facility]) [E11.9]  Yes   • Chronic venous insufficiency [I87.2]  Yes   • HTN (hypertension) [I10]  Yes      Resolved Hospital Problems   No resolved problems to display.     Acute on Chronic Combined systolic/diastolic CHF  - volume status looks much better  - on oral diuretic  - monitor I&O and chemistries  - has right pleural effusion but otherwise appears euvolemic-pulmonology following and appreciate recs. CT chest noted.   - appreciate cardiology  recs     Confusion  - likely delirium/sundowning exacerbated by dyspnea and hypoxemia from CHF  - will schedule melatonin and start PRN zyprexa  - lights on, drapes open during the day  - reorient as needed     Type 2 DM  - continue coverage with ssi/hypoglycemia      HTN  - BP is running high today, midodrine was DC'd  - monitor       JAYSHREE Plascencia  [facility]  10/03/19  10:29 AM

## 2019-10-03 NOTE — SIGNIFICANT NOTE
10/03/19 1100   Rehab Time/Intention   Evaluation Not Performed other (see comments)  (attempted eval this am. pt in restraints and very confused. Unable to convince pt to work with PT. Will follow up this pm if able.)   Rehab Treatment   Discipline physical therapist   Recommendation   PT - Next Appointment 10/03/19

## 2019-10-03 NOTE — PLAN OF CARE
Problem: Patient Care Overview  Goal: Plan of Care Review  Outcome: Ongoing (interventions implemented as appropriate)   10/03/19 0453   Coping/Psychosocial   Plan of Care Reviewed With patient   OTHER   Outcome Summary VSS on RA. No c/o pain or SOA. Pt ambulated in peng. Pt required frequent redirection from staff, refusing to sit in bed or chair, pulling off monitoor and O2 sat for sleep oximetry test. PRN zyprexa given. Order for restraints to maintain patient safety. Continue to monitor.     Goal: Individualization and Mutuality  Outcome: Ongoing (interventions implemented as appropriate)    Goal: Discharge Needs Assessment  Outcome: Ongoing (interventions implemented as appropriate)    Goal: Interprofessional Rounds/Family Conf  Outcome: Ongoing (interventions implemented as appropriate)      Problem: Fall Risk (Adult)  Goal: Absence of Fall  Outcome: Ongoing (interventions implemented as appropriate)      Problem: Skin Injury Risk (Adult)  Goal: Skin Health and Integrity  Outcome: Ongoing (interventions implemented as appropriate)      Problem: Restraint, Nonbehavioral (Nonviolent)  Goal: Rationale and Justification  Outcome: Ongoing (interventions implemented as appropriate)    Goal: Nonbehavioral (Nonviolent) Restraint: Absence of Injury/Harm  Outcome: Ongoing (interventions implemented as appropriate)    Goal: Nonbehavioral (Nonviolent) Restraint: Achievement of Discontinuation Criteria  Outcome: Ongoing (interventions implemented as appropriate)    Goal: Nonbehavioral (Nonviolent) Restraint: Preservation of Dignity and Wellbeing  Outcome: Ongoing (interventions implemented as appropriate)

## 2019-10-04 NOTE — PLAN OF CARE
Problem: Restraint, Nonbehavioral (Nonviolent)  Goal: Nonbehavioral (Nonviolent) Restraint: Absence of Injury/Harm  Outcome: Ongoing (interventions implemented as appropriate)    Goal: Nonbehavioral (Nonviolent) Restraint: Achievement of Discontinuation Criteria  Outcome: Ongoing (interventions implemented as appropriate)    Goal: Nonbehavioral (Nonviolent) Restraint: Preservation of Dignity and Wellbeing  Outcome: Ongoing (interventions implemented as appropriate)

## 2019-10-04 NOTE — TELEPHONE ENCOUNTER
She needs to let the doctors at the hospital finish treating her. I can look over the records and discuss dementia at her follow up appointment after she gets out.

## 2019-10-04 NOTE — PROGRESS NOTES
Lindsay Martinez   89 y.o.  female    LOS: 2 days   Patient Care Team:  Ronda Centeno MD as PCP - General (Family Medicine)      Subjective     Interval History:     Admit Complaint: AMS and confusion    Review of Systems:   Sleeping, I did not arouse her as she is in wrist restrains and calm/sleeping    Medication Review:   Current Facility-Administered Medications:   •  acetaminophen (TYLENOL) tablet 650 mg, 650 mg, Oral, Q4H PRN, 350 mg at 10/03/19 2146 **OR** acetaminophen (TYLENOL) 160 MG/5ML solution 650 mg, 650 mg, Oral, Q4H PRN **OR** acetaminophen (TYLENOL) suppository 650 mg, 650 mg, Rectal, Q4H PRN, Rosa Bobo MD  •  aspirin EC tablet 81 mg, 81 mg, Oral, Daily, Rosa Bobo MD, 81 mg at 10/03/19 0958  •  atorvastatin (LIPITOR) tablet 10 mg, 10 mg, Oral, Daily, Rosa Bobo MD, 10 mg at 10/03/19 0957  •  dextrose (D50W) 25 g/ 50mL Intravenous Solution 25 g, 25 g, Intravenous, Q15 Min PRN, Rosa Bobo MD  •  dextrose (GLUTOSE) oral gel 15 g, 15 g, Oral, Q15 Min PRN, Rosa Bobo MD  •  donepezil (ARICEPT) tablet 5 mg, 5 mg, Oral, Daily, Rosa Bobo MD, 5 mg at 10/03/19 0958  •  enoxaparin (LOVENOX) syringe 30 mg, 30 mg, Subcutaneous, Q24H, Rosa Bobo MD  •  ferrous sulfate tablet 325 mg, 325 mg, Oral, Daily With Breakfast, Rosa Bobo MD, 325 mg at 10/03/19 0957  •  furosemide (LASIX) tablet 40 mg, 40 mg, Oral, Daily, Tri Valentino APRN, 40 mg at 10/03/19 0957  •  glucagon (human recombinant) (GLUCAGEN DIAGNOSTIC) injection 1 mg, 1 mg, Subcutaneous, Q15 Min PRN, Rosa Bobo MD  •  Hold medication, 1 each, Does not apply, Continuous PRN, Familia Li MD  •  insulin lispro (humaLOG) injection 0-9 Units, 0-9 Units, Subcutaneous, 4x Daily With Meals & Nightly, Rosa Bobo MD, 2 Units at 10/03/19 0956  •  magnesium oxide (MAG-OX) tablet 400 mg, 400 mg, Oral, BID, Rosa Bobo MD, 400 mg at 10/03/19 2006  •  melatonin tablet 5 mg, 5 mg, Oral, Nightly, Joel Griffin MD, 5 mg at 10/03/19  2006  •  metoprolol succinate XL (TOPROL-XL) 24 hr tablet 12.5 mg, 12.5 mg, Oral, Daily, Rosa Bobo MD, 12.5 mg at 10/03/19 0959  •  mirtazapine (REMERON) tablet 7.5 mg, 7.5 mg, Oral, Nightly, Rosa Bobo MD, 7.5 mg at 10/03/19 2006  •  nitroglycerin (NITROSTAT) SL tablet 0.4 mg, 0.4 mg, Sublingual, Q5 Min PRN, Rosa Bobo MD  •  OLANZapine (zyPREXA) tablet 10 mg, 10 mg, Oral, Nightly PRN, Joel Griffin MD  •  ondansetron (ZOFRAN) injection 4 mg, 4 mg, Intravenous, Q6H PRN, Rosa Bobo MD  •  potassium chloride (MICRO-K) CR capsule 20 mEq, 20 mEq, Oral, Daily, Joel Griffin MD, 20 mEq at 10/03/19 1728  •  [COMPLETED] Insert peripheral IV, , , Once **AND** sodium chloride 0.9 % flush 10 mL, 10 mL, Intravenous, PRN, Sophia Hill, APRN  •  sodium chloride 0.9 % flush 10 mL, 10 mL, Intravenous, Q12H, Rosa Bobo MD, 10 mL at 10/04/19 1040  •  sodium chloride 0.9 % flush 10 mL, 10 mL, Intravenous, PRN, Rosa Bobo MD      Objective     Vital Sign Min/Max for last 24 hours  Temp  Min: 97.3 °F (36.3 °C)  Max: 97.8 °F (36.6 °C)   BP  Min: 99/58  Max: 150/71    Pulse  Min: 66  Max: 112     Wt Readings from Last 3 Encounters:   10/03/19 59.5 kg (131 lb 1.6 oz)   08/19/19 59.9 kg (132 lb)   07/30/19 62.2 kg (137 lb 3.2 oz)        Intake/Output Summary (Last 24 hours) at 10/4/2019 1302  Last data filed at 10/4/2019 1246  Gross per 24 hour   Intake --   Output 1400 ml   Net -1400 ml     Physical Exam: I did not arouse her for exam as she is in wrist restraints and calm/sleeping     General Appearance:    Elderly looking female in no acute distress asleep in bed   Head:    Normocephalic, atraumatic   Eyes:            no xanthelasma   Neck:   No JVD   Lungs:     Clear to auscultation bilaterally. No wheezes, rhonchi or rales. No accessory muscle use.     Heart:    Regular rate and rhythm.  Normal S1 and S2. No murmur, no gallop, rub or lift.    Chest Wall:    No abnormalities observed   Abdomen:      Normal bowel sounds,  soft   Rectal:     Deferred   Extremities:   No clubbing, cyanosis or edema.     Pulses:   Pulses palpable and equal bilaterally   Skin:   No bleeding, bruising or rash   Neurologic:   sleeping      Monitor:  nsr  Results Review:     I reviewed the patient's new clinical results.    Sodium Sodium   Date Value Ref Range Status   10/04/2019 141 136 - 145 mmol/L Final   10/02/2019 142 136 - 145 mmol/L Final   10/01/2019 142 136 - 145 mmol/L Final      Potassium Potassium   Date Value Ref Range Status   10/04/2019 4.1 3.5 - 5.2 mmol/L Final   10/02/2019 3.7 3.5 - 5.2 mmol/L Final   10/01/2019 4.6 3.5 - 5.2 mmol/L Final      Chloride Chloride   Date Value Ref Range Status   10/04/2019 101 98 - 107 mmol/L Final   10/02/2019 101 98 - 107 mmol/L Final   10/01/2019 104 98 - 107 mmol/L Final      Bicarbonate No results found for: PLASMABICARB   BUN BUN   Date Value Ref Range Status   10/04/2019 29 (H) 8 - 23 mg/dL Final   10/02/2019 23 8 - 23 mg/dL Final   10/01/2019 21 8 - 23 mg/dL Final      Creatinine Creatinine   Date Value Ref Range Status   10/04/2019 1.38 (H) 0.57 - 1.00 mg/dL Final   10/02/2019 1.19 (H) 0.57 - 1.00 mg/dL Final   10/01/2019 1.11 (H) 0.57 - 1.00 mg/dL Final      Calcium Calcium   Date Value Ref Range Status   10/04/2019 9.0 8.6 - 10.5 mg/dL Final   10/02/2019 9.1 8.6 - 10.5 mg/dL Final   10/01/2019 8.8 8.6 - 10.5 mg/dL Final      Magnesium No results found for: MG     Results from last 7 days   Lab Units 10/04/19  0509   WBC 10*3/mm3 5.05   HEMOGLOBIN g/dL 11.5*   HEMATOCRIT % 36.8   PLATELETS 10*3/mm3 211     Lab Results   Lab Value Date/Time    TROPONINT <0.010 10/01/2019 1403     No results found for: CHOL  Lab Results   Component Value Date    HDL 58 07/30/2019    HDL 43 (L) 03/26/2018     Lab Results   Component Value Date    LDL 89 07/30/2019    LDL 65 03/26/2018     Lab Results   Component Value Date    TRIG 123 07/30/2019    TRIG 56 03/26/2018     No components found for:  CHOLHDL  [unfilled]  Results from last 7 days   Lab Units 10/04/19  0509   INR  1.06         Echo EF Estimated  No results found for: ECHOEFEST  ECHO: March 26, 2018: at [facility]  EF 61%, moderate to severe left atrial enlargement, mild to moderate mitral stenosis,     Assessment/Plan   1. Acute Diastolic CHF, euvolemic  ECHO: March 26, 2018: at [facility], EF 61%, moderate to severe left atrial enlargement, mild to moderate mitral stenosis,      2. Dementia     3. H/o htn- Now on proamatine      4. Dyslipidemia- statin    5. Pleural effusion-thoracentesis this AM          Kamilla Kelley, JAYSHREE  10/04/19  1:02 PM      Time: 10 min  Patient seen and examined  1 and S2 normal, lungs clear  Agree with the note

## 2019-10-04 NOTE — PROGRESS NOTES
Name: Lindsay Martinez ADMIT: 10/1/2019   : 1930  PCP: Ronda Centeno MD    MRN: [id number] LOS: 2 days   AGE/SEX: 89 y.o. female  ROOM: [facility]     Subjective   Subjective   no new complaints, resting in bed comfortably. still in restraints.    denies chest pain, palpitations, SOA, fever, chills, nausea and vomiting        Objective   Objective   Vital Signs  Temp:  [97.3 °F (36.3 °C)-97.8 °F (36.6 °C)] 97.8 °F (36.6 °C)  Heart Rate:  [] 66  Resp:  [16-18] 16  BP: ()/(58-71) 99/58  SpO2:  [92 %-97 %] 92 %  on   ;   Device (Oxygen Therapy): room air  Body mass index is 25.6 kg/m².  Physical Exam   Constitutional: She appears well-developed and well-nourished. No distress.   HENT:   Head: Normocephalic and atraumatic.   Cardiovascular: Normal rate and regular rhythm.   Murmur heard.  paced   Pulmonary/Chest: Effort normal. No respiratory distress.   Musculoskeletal: Normal range of motion. She exhibits no edema.   Neurological: She is alert. She is disoriented.   Psychiatric: She has a normal mood and affect. Her behavior is normal.   Nursing note and vitals reviewed.      Results Review:       I reviewed the patient's new clinical results.  Results from last 7 days   Lab Units 10/04/19  0509 10/02/19  0655 10/01/19  1403   WBC 10*3/mm3 5.05 4.87 6.19   HEMOGLOBIN g/dL 11.5* 11.4* 11.0*   PLATELETS 10*3/mm3 211 211 217     Results from last 7 days   Lab Units 10/04/19  0509 10/02/19  0655 10/01/19  1403   SODIUM mmol/L 141 142 142   POTASSIUM mmol/L 4.1 3.7 4.6   CHLORIDE mmol/L 101 101 104   CO2 mmol/L 28.6 29.0 23.9   BUN mg/dL 29* 23 21   CREATININE mg/dL 1.38* 1.19* 1.11*   GLUCOSE mg/dL 136* 131* 137*   Estimated Creatinine Clearance: 22.3 mL/min (A) (by C-G formula based on SCr of 1.38 mg/dL (H)).  Results from last 7 days   Lab Units 10/02/19  0655 10/01/19  1403   ALBUMIN g/dL 4.10 4.10   BILIRUBIN mg/dL 0.6 0.5   ALK PHOS U/L 118* 113   AST (SGOT) U/L 13 16   ALT (SGPT) U/L 5 6      Results from last 7 days   Lab Units 10/04/19  0509 10/02/19  0655 10/01/19  1403   CALCIUM mg/dL 9.0 9.1 8.8   ALBUMIN g/dL  --  4.10 4.10     Results from last 7 days   Lab Units 10/02/19  0655 10/01/19  1403   PROCALCITONIN ng/mL 0.07* 0.06*   LACTATE mmol/L 1.3  --      Hemoglobin A1C   Date/Time Value Ref Range Status   10/02/2019 0655 5.40 4.80 - 5.60 % Final     Glucose   Date/Time Value Ref Range Status   10/04/2019 1316 130 70 - 130 mg/dL Final   10/04/2019 0701 144 (H) 70 - 130 mg/dL Final   10/03/2019 2017 154 (H) 70 - 130 mg/dL Final   10/03/2019 1600 149 (H) 70 - 130 mg/dL Final   10/03/2019 1053 146 (H) 70 - 130 mg/dL Final   10/03/2019 0620 172 (H) 70 - 130 mg/dL Final   10/02/2019 2022 141 (H) 70 - 130 mg/dL Final         aspirin 81 mg Oral Daily   atorvastatin 10 mg Oral Daily   donepezil 5 mg Oral Daily   enoxaparin 30 mg Subcutaneous Q24H   ferrous sulfate 325 mg Oral Daily With Breakfast   furosemide 40 mg Oral Daily   insulin lispro 0-9 Units Subcutaneous 4x Daily With Meals & Nightly   magnesium oxide 400 mg Oral BID   melatonin 5 mg Oral Nightly   metoprolol succinate XL 12.5 mg Oral Daily   mirtazapine 7.5 mg Oral Nightly   potassium chloride 20 mEq Oral Daily   sodium chloride 10 mL Intravenous Q12H       hold 1 each   Diet Regular; Cardiac, Consistent Carbohydrate, Renal       Assessment/Plan     Active Hospital Problems    Diagnosis  POA   • **Acute on chronic combined systolic and diastolic CHF (congestive heart failure) (CMS/HCC) [I50.43]  Yes   • Delirium, acute [R41.0]  Yes   • Hyperparathyroidism (CMS/[facility]) [E21.3]  Yes   • Type 2 diabetes mellitus without complication, without long-term current use of insulin (CMS/[facility]) [E11.9]  Yes   • Chronic venous insufficiency [I87.2]  Yes   • HTN (hypertension) [I10]  Yes      Resolved Hospital Problems   No resolved problems to display.     Acute on Chronic Combined systolic/diastolic CHF  - volume status looks much better  - on oral  diuretic  - monitor I&O and chemistries  - has right pleural effusion but otherwise appears euvolemic-pulmonology following and appreciate recs. CT chest noted.   - appreciate cardiology recs    Right pleural effusion  - s/p thoracentesis      Confusion  - likely delirium/sundowning exacerbated by dyspnea and hypoxemia from CHF  - will schedule melatonin and start PRN zyprexa  - lights on, drapes open during the day  - reorient as needed     Type 2 DM  - continue coverage with ssi/hypoglycemia      HTN  - BP is running low postoperatively. midodrine had been held. monitor.       JAYSHREE Plascencia  [facility]  10/04/19  3:32 PM

## 2019-10-04 NOTE — TELEPHONE ENCOUNTER
Granddaughter Jennifer Kennedy called regarding patient.  She is at [facility] to get fluid removed from her heart.  She also says that she has Dementia and this morning she didn't know who she was.  She wants to know if Dr Centeno can look at the records to see what's going on and call her, she wonders if she needs something stronger for the dementia.  Her number is [phone].  She said the last time this happened, they took her to [facility] and she seemed to do well.

## 2019-10-04 NOTE — NURSING NOTE
Patient here for a right thoracentesis, granddaughter at the bedside and consented for patient. Patient premedicated due to confusion and being combative before coming down to xray.

## 2019-10-04 NOTE — PROGRESS NOTES
[facility]  [phone]  Familia Li MD    Subjective:  LOS: 2    She is sleepy after the pleural tap.  Does not wake up for me.  Daughter states this is often the case after any procedure.    Objective   Vital Signs past 24hrs    Temp range: Temp (24hrs), Av.6 °F (36.4 °C), Min:97.3 °F (36.3 °C), Max:97.8 °F (36.6 °C)    BP range: BP: ()/(58-95) 149/66  Pulse range: Heart Rate:  [] 74  Resp rate range: Resp:  [14-18] 14    Device (Oxygen Therapy): room air   Oxygen range:SpO2:  [92 %-98 %] 94 %      59.5 kg (131 lb 1.6 oz); Body mass index is 25.6 kg/m².    Intake/Output Summary (Last 24 hours) at 10/4/2019 1659  Last data filed at 10/4/2019 1300  Gross per 24 hour   Intake 0 ml   Output 1400 ml   Net -1400 ml       Physical Exam   Constitutional: She appears well-developed.   HENT:   Head: Normocephalic.   Cardiovascular: Normal rate and regular rhythm.   No murmur heard.  Pulmonary/Chest: Effort normal. No respiratory distress. She has decreased breath sounds (right lung base only). She has no wheezes. She has no rales.   Dull to percuss right lung base   Abdominal: Soft. Bowel sounds are normal. She exhibits no mass. There is no tenderness.   Musculoskeletal: She exhibits no edema.   Skin: No rash noted.     Results Review:    I have reviewed the laboratory and imaging data since the last note by [facility] physician.  My annotations are noted in assessment and plan.    Medication Review:  I have reviewed the current MAR.  My annotations are noted in assessment and plan.      hold 1 each     Plan   PCCM Problems  Right lower lobe infiltrate  Right lower lobe effusion  Recent cardiac events  Confusion and disorientation  Perhaps baseline dementia  Relevant Medical Diagnoses  CAD  Recent pacemaker  Recent cardiac arrest    Plan of Treatment  Large right pleural effusion not status post tap on 10/4/2019.  Looks transudative.  Get chest x-ray tomorrow.    If patient continues to redevelop  pleural effusions secondary to heart failure a consideration would be placement of a Pleurx catheter.      Right lower lobe infiltrate likely all just atelectasis adjacent to the effusion.  No evidence infection.      Familia Li MD  10/04/19  4:59 PM    Part of this note may be an electronic transcription/translation of spoken language to printed text using the Dragon Dictation System.

## 2019-10-04 NOTE — PLAN OF CARE
Problem: Patient Care Overview  Goal: Plan of Care Review  Outcome: Ongoing (interventions implemented as appropriate)   10/04/19 0409   Coping/Psychosocial   Plan of Care Reviewed With patient   OTHER   Outcome Summary PRN zyprexa given, pt confused but cooperative, thoracentesis in AM, VSS, will continue to monitor   Plan of Care Review   Progress no change       Problem: Fall Risk (Adult)  Goal: Absence of Fall  Outcome: Ongoing (interventions implemented as appropriate)      Problem: Skin Injury Risk (Adult)  Goal: Skin Health and Integrity  Outcome: Ongoing (interventions implemented as appropriate)

## 2019-10-04 NOTE — CONSULTS
Access talked with pt's granddaughter who has lived with pt since July 2019. Pt is currently confused. Granddaughter states pt usually has some confusion after any medical procedures and tends to come out of it after a few days. She states last time this happened, pt was given Haldol and she cleared. Granddaughter states that pt does not have any hx of psychiatric care. She states pt does not use substances. Pt states pt has a dx of Alzheimers but it mostly is pt repeating herself. She states pt has no hx of SI. She states pt has been getting little sleep the past 3 nights and has had some paranoia. Pt had a medical procedure last week.     Access will follow from afar. If pt does not clear, please put in an order for the psychiatrist.

## 2019-10-04 NOTE — PROGRESS NOTES
Continued Stay Note  [facility]     Patient Name: Lindsay Martinez  MRN: [id number]  Today's Date: 10/4/2019    Admit Date: 10/1/2019    Discharge Plan     Row Name 10/04/19 1721       Plan    Plan  Return home with granddaughter and [facility]    Patient/Family in Agreement with Plan  yes    Plan Comments  Pt had thoracentesis with 650cc removed.  Granddaughter plans to return home with pt and [facility] as PTA.  Issues with confusion and requiring restraints.  Continue to follow for discharge needs. Shlomo Carrillo RN-BC        Discharge Codes    No documentation.             Shlomo Carrillo RN

## 2019-10-04 NOTE — PLAN OF CARE
Problem: Patient Care Overview  Goal: Plan of Care Review  Outcome: Ongoing (interventions implemented as appropriate)   10/04/19 1358   Coping/Psychosocial   Plan of Care Reviewed With grandchild(amanda)   OTHER   Outcome Summary vss. safety maintained. restless this morning and pulling at equipment. alireza soft wrist restraints applied. ativan for thoracentesis this am. remains somewhat sedated. meds held. md aware.

## 2019-10-05 NOTE — PROGRESS NOTES
[facility]     Mar/chart reviewed, patient is new to this examiner  Follow up pleural effusion.   Sleepy says she doesn't want to wake up. Doesn't participate    Vital Sign Min/Max for last 24 hours  Temp  Min: 97.6 °F (36.4 °C)  Max: 98.2 °F (36.8 °C)   BP  Min: 99/58  Max: 149/66   Pulse  Min: 64  Max: 77   Resp  Min: 14  Max: 16   SpO2  Min: 92 %  Max: 98 %   No Data Recorded   Weight  Min: 58.8 kg (129 lb 11.2 oz)  Max: 58.8 kg (129 lb 11.2 oz)     Appears ill , sleepy, exam limited by patient participation,    no palpable thyroid nodules  mmm, no jvd, trachea midline, neck supple,  chest cta bilaterally, no crackles, no wheezes,   rrr,   soft, nt, nd +bs,  no c/c/ e  Skin warm, dry, no rashes      CXR: there is some mild infiltrate at the bases, and there does not appear to be much residual effusion.    Lab: 10/5:  Cr 1.3  Bicarb 26  Wbc 4.9  hgb 11.2  plts 186    PCCM Problems  Right lower lobe infiltrate  Right lower lobe effusion  Recent cardiac events  Confusion and disorientation  Perhaps baseline dementia  Relevant Medical Diagnoses  CAD  Recent pacemaker  Recent cardiac arrest     Plan of Treatment  Continue efforts at diuresis. Agree effusion appears transudate.  Would try pretty hard to avoid pleurix catheter placement in 88yo    Probably continue to watch a little longer.

## 2019-10-05 NOTE — NURSING NOTE
Spoke with Dr. Griffin, aware blood sugar 66, rec'd D50 iv x's 1, and blood sugar now 139. Aware pt drowsy, will arouse but falls back asleep easily.

## 2019-10-05 NOTE — PLAN OF CARE
Problem: Patient Care Overview  Goal: Plan of Care Review  Outcome: Ongoing (interventions implemented as appropriate)   10/05/19 7867   Coping/Psychosocial   Plan of Care Reviewed With patient   OTHER   Outcome Summary pt presents with impaired functional mobility and activity tolerance, she will benefit from PT to address. At baseline pt is independent with cane, lives with granddaughter. Pt was extremely weak today, needed mod a x 2 to walk a short distance, gait was very unsteady with poor quality, pt may need short SNF stay prior to going to her home

## 2019-10-05 NOTE — PLAN OF CARE
Problem: Patient Care Overview  Goal: Plan of Care Review  Outcome: Ongoing (interventions implemented as appropriate)   10/05/19 1531   OTHER   Outcome Summary vss. restraints d/c'd this afternoon. pt very drowsy this am, awake this afternoon, took po meds and ate lunch. resting in bed quietly.     Goal: Individualization and Mutuality  Outcome: Ongoing (interventions implemented as appropriate)    Goal: Discharge Needs Assessment  Outcome: Ongoing (interventions implemented as appropriate)    Goal: Interprofessional Rounds/Family Conf  Outcome: Ongoing (interventions implemented as appropriate)      Problem: Fall Risk (Adult)  Goal: Absence of Fall  Outcome: Ongoing (interventions implemented as appropriate)      Problem: Skin Injury Risk (Adult)  Goal: Skin Health and Integrity  Outcome: Ongoing (interventions implemented as appropriate)

## 2019-10-05 NOTE — PLAN OF CARE
Problem: Patient Care Overview  Goal: Plan of Care Review  Outcome: Ongoing (interventions implemented as appropriate)   10/05/19 0535   Coping/Psychosocial   Plan of Care Reviewed With patient   OTHER   Outcome Summary no complaints of pain, restraints continued, pt resting comfortably, VSS, will continue to monitor   Plan of Care Review   Progress no change       Problem: Fall Risk (Adult)  Goal: Absence of Fall  Outcome: Ongoing (interventions implemented as appropriate)      Problem: Skin Injury Risk (Adult)  Goal: Skin Health and Integrity  Outcome: Ongoing (interventions implemented as appropriate)      Problem: Restraint, Nonbehavioral (Nonviolent)  Goal: Rationale and Justification  Outcome: Ongoing (interventions implemented as appropriate)    Goal: Nonbehavioral (Nonviolent) Restraint: Absence of Injury/Harm  Outcome: Ongoing (interventions implemented as appropriate)    Goal: Nonbehavioral (Nonviolent) Restraint: Achievement of Discontinuation Criteria  Outcome: Ongoing (interventions implemented as appropriate)    Goal: Nonbehavioral (Nonviolent) Restraint: Preservation of Dignity and Wellbeing  Outcome: Ongoing (interventions implemented as appropriate)

## 2019-10-05 NOTE — PROGRESS NOTES
Lindsay Martinez   89 y.o.  female    LOS: 3 days   Patient Care Team:  Ronda Centeno MD as PCP - General (Family Medicine)      Subjective   Sleepy  Interval History:     Patient Complaints:     Review of Systems:       Medication Review:   Current Facility-Administered Medications:   •  acetaminophen (TYLENOL) tablet 650 mg, 650 mg, Oral, Q4H PRN, 350 mg at 10/03/19 2146 **OR** acetaminophen (TYLENOL) 160 MG/5ML solution 650 mg, 650 mg, Oral, Q4H PRN **OR** acetaminophen (TYLENOL) suppository 650 mg, 650 mg, Rectal, Q4H PRN, Rosa Bobo MD  •  aspirin EC tablet 81 mg, 81 mg, Oral, Daily, Rosa Bobo MD, 81 mg at 10/03/19 0958  •  atorvastatin (LIPITOR) tablet 10 mg, 10 mg, Oral, Daily, Rosa Bobo MD, 10 mg at 10/03/19 0957  •  dextrose (D50W) 25 g/ 50mL Intravenous Solution 25 g, 25 g, Intravenous, Q15 Min PRN, Rosa Bobo MD  •  dextrose (GLUTOSE) oral gel 15 g, 15 g, Oral, Q15 Min PRN, Rosa Bobo MD  •  donepezil (ARICEPT) tablet 5 mg, 5 mg, Oral, Daily, Rosa Bobo MD, 5 mg at 10/03/19 0958  •  enoxaparin (LOVENOX) syringe 30 mg, 30 mg, Subcutaneous, Q24H, Rosa Bobo MD, 30 mg at 10/04/19 2106  •  ferrous sulfate tablet 325 mg, 325 mg, Oral, Daily With Breakfast, Rosa Bobo MD, 325 mg at 10/03/19 0957  •  furosemide (LASIX) tablet 40 mg, 40 mg, Oral, Daily, Tri Valentino APRN, 40 mg at 10/03/19 0957  •  glucagon (human recombinant) (GLUCAGEN DIAGNOSTIC) injection 1 mg, 1 mg, Subcutaneous, Q15 Min PRN, Rsoa Bobo MD  •  Hold medication, 1 each, Does not apply, Continuous PRN, Familia Li MD  •  insulin lispro (humaLOG) injection 0-9 Units, 0-9 Units, Subcutaneous, 4x Daily With Meals & Nightly, Rosa Bobo MD, 2 Units at 10/03/19 0956  •  magnesium oxide (MAG-OX) tablet 400 mg, 400 mg, Oral, BID, Rosa Bobo MD, 400 mg at 10/03/19 2006  •  melatonin tablet 5 mg, 5 mg, Oral, Nightly, Joel Griffin MD, 5 mg at 10/03/19 2006  •  metoprolol succinate XL (TOPROL-XL) 24 hr tablet  12.5 mg, 12.5 mg, Oral, Daily, Rosa Bobo MD, 12.5 mg at 10/03/19 0959  •  mirtazapine (REMERON) tablet 7.5 mg, 7.5 mg, Oral, Nightly, Rosa Bobo MD, 7.5 mg at 10/03/19 2006  •  nitroglycerin (NITROSTAT) SL tablet 0.4 mg, 0.4 mg, Sublingual, Q5 Min PRN, Rosa Bobo MD  •  OLANZapine (zyPREXA) tablet 10 mg, 10 mg, Oral, Nightly PRN, Joel Griffin MD  •  ondansetron (ZOFRAN) injection 4 mg, 4 mg, Intravenous, Q6H PRN, Rosa Bobo MD  •  potassium chloride (MICRO-K) CR capsule 20 mEq, 20 mEq, Oral, Daily, Joel Griffin MD, 20 mEq at 10/03/19 1728  •  [COMPLETED] Insert peripheral IV, , , Once **AND** sodium chloride 0.9 % flush 10 mL, 10 mL, Intravenous, PRN, Sophia Hill, JAYSHREE  •  sodium chloride 0.9 % flush 10 mL, 10 mL, Intravenous, Q12H, Rosa Bobo MD, 10 mL at 10/04/19 2106  •  sodium chloride 0.9 % flush 10 mL, 10 mL, Intravenous, PRN, Rosa Bobo MD      Objective   Vital Sign Min/Max for last 24 hours  Temp  Min: 97.6 °F (36.4 °C)  Max: 98.2 °F (36.8 °C)   BP  Min: 99/58  Max: 149/66    Pulse  Min: 64  Max: 77     Wt Readings from Last 3 Encounters:   10/05/19 58.8 kg (129 lb 11.2 oz)   08/19/19 59.9 kg (132 lb)   07/30/19 62.2 kg (137 lb 3.2 oz)        Intake/Output Summary (Last 24 hours) at 10/5/2019 1044  Last data filed at 10/4/2019 1300  Gross per 24 hour   Intake 0 ml   Output 650 ml   Net -650 ml     Physical Exam:      General Appearance:    Well developed and well nourished in no acute distress   Head:    Normocephalic, atraumatic   Eyes:            Conjunctivae normal, non icteric, no xanthelasma   Neck:   supple, trachea midline, no thyromegaly, no carotid bruit, no JVD, no elevated CVP   Lungs:     Clear to auscultation,respirations regular, even and                  unlabored    Heart:    Regular rhythm and normal rate, normal S1 and S2,            No murmur, no gallop, no rub, no click   Chest Wall:    No abnormalities observed   Abdomen:     Normal bowel sounds,  no masses, no organomegaly, soft        non-tender, non-distended, no guarding, no rebound                tenderness   Rectal:     Deferred   Extremities:   No edema. Moves all extremities well, no cyanosis, no erythema   Pulses:   Pulses palpable and equal bilaterally   Skin:   No bleeding, bruising or rash   Neurologic:   awake alert and oriented x3, speech clear and approp, no facial drooping     :    Monitor:      Results Review:         Sodium Sodium   Date Value Ref Range Status   10/05/2019 145 136 - 145 mmol/L Final   10/04/2019 141 136 - 145 mmol/L Final      Potassium Potassium   Date Value Ref Range Status   10/05/2019 3.9 3.5 - 5.2 mmol/L Final   10/04/2019 4.1 3.5 - 5.2 mmol/L Final      Chloride Chloride   Date Value Ref Range Status   10/05/2019 105 98 - 107 mmol/L Final   10/04/2019 101 98 - 107 mmol/L Final      Bicarbonate No results found for: PLASMABICARB   BUN BUN   Date Value Ref Range Status   10/05/2019 34 (H) 8 - 23 mg/dL Final   10/04/2019 29 (H) 8 - 23 mg/dL Final      Creatinine Creatinine   Date Value Ref Range Status   10/05/2019 1.30 (H) 0.57 - 1.00 mg/dL Final   10/04/2019 1.38 (H) 0.57 - 1.00 mg/dL Final      Calcium Calcium   Date Value Ref Range Status   10/05/2019 8.5 (L) 8.6 - 10.5 mg/dL Final   10/04/2019 9.0 8.6 - 10.5 mg/dL Final      Magnesium No results found for: MG     Results from last 7 days   Lab Units 10/05/19  0426   WBC 10*3/mm3 4.90   HEMOGLOBIN g/dL 11.2*   HEMATOCRIT % 34.9   PLATELETS 10*3/mm3 186     Lab Results   Lab Value Date/Time    TROPONINT <0.010 10/01/2019 1403     No results found for: CHOL  Lab Results   Component Value Date    HDL 58 07/30/2019    HDL 43 (L) 03/26/2018     Lab Results   Component Value Date    LDL 89 07/30/2019    LDL 65 03/26/2018     Lab Results   Component Value Date    TRIG 123 07/30/2019    TRIG 56 03/26/2018     No components found for: CHOLHDL  PTT   Date Value Ref Range Status   10/04/2019 29.3 22.7 - 35.4 seconds Final      No components found for: PT/INR  Lab Results   Component Value Date    HGBA1C 5.40 10/02/2019      Lab Results   Component Value Date    TSH 0.577 03/27/2018        Echo EF Estimated  No results found for: ECHOEFEST      Assessment/ Plan    Active Hospital Problems    Diagnosis POA   • **Acute on chronic combined systolic and diastolic CHF (congestive heart failure) (CMS/[facility]) [I50.43] Yes   • Delirium, acute [R41.0] Yes   • Hyperparathyroidism (CMS/[facility]) [E21.3] Yes   • Type 2 diabetes mellitus without complication, without long-term current use of insulin (CMS/[facility]) [E11.9] Yes   • Chronic venous insufficiency [I87.2] Yes   • HTN (hypertension) [I10] Yes     1. Acute Diastolic CHF, euvolemic  ECHO: March 26, 2018: at [facility], EF 61%, moderate to severe left atrial enlargement, mild to moderate mitral stenosis,   Status post thoracentesis  Status post pacemaker  To continue same treatment      Nithin Pedro MD  10/05/19  10:44 AM

## 2019-10-05 NOTE — THERAPY EVALUATION
Patient Name: Lindsay Martinez  : 1930    MRN: [id number]                              Today's Date: 10/5/2019       Admit Date: 10/1/2019    Visit Dx:     ICD-10-CM ICD-9-CM   1. Acute on chronic combined systolic and diastolic CHF (congestive heart failure) (CMS/HCC) I50.43 428.43     428.0     Patient Active Problem List   Diagnosis   • HTN (hypertension)   • Hyperlipidemia   • Late onset Alzheimer's disease without behavioral disturbance (CMS/HCC)   • Chronic venous insufficiency   • Type 2 diabetes mellitus without complication, without long-term current use of insulin (CMS/HCC)   • Hyperparathyroidism (CMS/HCC)   • Acute congestive heart failure (CMS/HCC)   • Myocardial infarction (CMS/[facility])   • Acute on chronic combined systolic and diastolic CHF (congestive heart failure) (CMS/[facility])   • Delirium, acute     Past Medical History:   Diagnosis Date   • Altered mental state    • Chronic venous insufficiency    • Encephalopathy acute    • Hyperlipidemia    • Hypertension    • Late onset Alzheimer's disease without behavioral disturbance (CMS/[facility])      Past Surgical History:   Procedure Laterality Date   • EYE SURGERY     • HERNIA REPAIR     • HYSTERECTOMY     • REFRACTIVE SURGERY Right    • THYROID SURGERY       General Information     Row Name 10/05/19 1651          PT Evaluation Time/Intention    Document Type  evaluation  -PC     Mode of Treatment  physical therapy  -PC     Row Name 10/05/19 165          General Information    Patient Profile Reviewed?  yes  -PC     Prior Level of Function  independent: uses straight cane  -PC     Existing Precautions/Restrictions  fall pacemaker x 1 week ago  -PC     Barriers to Rehab  cognitive status  -PC     Row Name 10/05/19 165          Cognitive Assessment/Intervention- PT/OT    Orientation Status (Cognition)  oriented to;person  -PC     Cognitive Assessment/Intervention Comment  pt is calm, pleasant and cooperative, no acute distress, in bed, she did think that  "she saw \"gnats\" on her chair  -PC     Row Name 10/05/19 1651          Safety Issues, Functional Mobility    Safety Issues Affecting Function (Mobility)  impulsivity;insight into deficits/self awareness;judgment  -PC     Impairments Affecting Function (Mobility)  balance;cognition;endurance/activity tolerance;strength  -PC       User Key  (r) = Recorded By, (t) = Taken By, (c) = Cosigned By    Initials Name Provider Type    PC Chioma Lewis PT Physical Therapist        Mobility     Row Name 10/05/19 1653          Bed Mobility Assessment/Treatment    Bed Mobility Assessment/Treatment  supine-sit;sit-supine  -PC     Supine-Sit [place] (Bed Mobility)  minimum assist (75% patient effort)  -PC     Row Name 10/05/19 1653          Sit-Stand Transfer    Sit-Stand [place] (Transfers)  moderate assist (50% patient effort)  -PC     Assistive Device (Sit-Stand Transfers)  cane, straight  -PC     Row Name 10/05/19 1653          Gait/Stairs Assessment/Training    [place] Level (Gait)  moderate assist (50% patient effort);2 person assist  -PC     Assistive Device (Gait)  cane, straight  -PC     Distance in Feet (Gait)  12 ft, pt very weak, poor gait quality, may need rwx instead of cane  -PC     Pattern (Gait)  step-to  -PC     Deviations/Abnormal Patterns (Gait)  base of support, narrow;[place] decreased;festinating/shuffling;gait speed decreased;stride length decreased  -PC     Bilateral Gait Deviations  forward flexed posture;heel strike decreased  -PC       User Key  (r) = Recorded By, (t) = Taken By, (c) = Cosigned By    Initials Name Provider Type     Chioma Lewis PT Physical Therapist        Obj/Interventions     Row Name 10/05/19 1658          General ROM    GENERAL ROM COMMENTS  WFL  -PC     Row Name 10/05/19 1658          MMT (Manual Muscle Testing)    General MMT Comments  3/5  -PC     Row Name 10/05/19 1658          Static Sitting Balance    Level of [place] (Unsupported Sitting, Static " Balance)  contact guard assist  -PC     Sitting Position (Unsupported Sitting, Static Balance)  sitting on edge of bed  -PC     Row Name 10/05/19 165          Static Standing Balance    Level of [place] (Supported Standing, Static Balance)  minimal assist, 75% patient effort;moderate assist, 50 to 74% patient effort  -PC     Assistive Device Utilized (Supported Standing, Static Balance)  cane, straight  -PC     Row Name 10/05/19 1656          Dynamic Standing Balance    Level of [place], Reaches Outside Midline (Standing, Dynamic Balance)  moderate assist, 50 to 74% patient effort;2 person assist  -PC     Assistive Device Utilized (Supported Standing, Dynamic Balance)  cane, straight  -PC     Row Name 10/05/19 1659          Sensory Assessment/Intervention    Sensory General Assessment  no sensation deficits identified  -PC       User Key  (r) = Recorded By, (t) = Taken By, (c) = Cosigned By    Initials Name Provider Type    PC Chioma Lewis, PT Physical Therapist        Goals/Plan     Row Name 10/05/19 1700          Bed Mobility Goal 1 (PT)    Activity/Assistive Device (Bed Mobility Goal 1, PT)  sit to supine;supine to sit  -     [place] Level/Cues Needed (Bed Mobility Goal 1, PT)  supervision required  -PC     Time Frame (Bed Mobility Goal 1, PT)  1 week  -PC     Row Name 10/05/19 1707          Transfer Goal 1 (PT)    Activity/Assistive Device (Transfer Goal 1, PT)  sit-to-stand/stand-to-sit;walker, rolling  -PC     [place] Level/Cues Needed (Transfer Goal 1, PT)  supervision required  -PC     Time Frame (Transfer Goal 1, PT)  1 week  -PC     Row Name 10/05/19 1703          Gait Training Goal 1 (PT)    Activity/Assistive Device (Gait Training Goal 1, PT)  gait (walking locomotion);assistive device use  -PC     [place] Level (Gait Training Goal 1, PT)  supervision required  -PC     Distance (Gait Goal 1, PT)  50 ft  -PC     Time Frame (Gait Training Goal 1, PT)  1 week  -PC       User  Key  (r) = Recorded By, (t) = Taken By, (c) = Cosigned By    Initials Name Provider Type    PC Chioma Lewis, PT Physical Therapist        Clinical Impression     Row Name 10/05/19 1703          Pain Assessment    Additional Documentation  Pain Scale: Numbers Pre/Post-Treatment (Group)  -PC     Row Name 10/05/19 1703          Pain Scale: Numbers Pre/Post-Treatment    Pre/Post Treatment Pain Comment  pt reports generalized pain  -PC     Row Name 10/05/19 1703          Plan of Care Review    Plan of Care Reviewed With  patient  -PC     Row Name 10/05/19 1703          Physical Therapy Clinical Impression    Criteria for Skilled Interventions Met (PT Clinical Impression)  yes;treatment indicated  -PC     Rehab Potential (PT Clinical Summary)  fair, will monitor progress closely  -PC     Row Name 10/05/19 1703          Positioning and Restraints    Pre-Treatment Position  in bed  -PC     Post Treatment Position  chair  -PC     In Chair  sitting;call light within reach;encouraged to call for assist;exit alarm on;notified nsg;with family/caregiver  -PC       User Key  (r) = Recorded By, (t) = Taken By, (c) = Cosigned By    Initials Name Provider Type    PC Chioma Lewis, PT Physical Therapist        Outcome Measures     Row Name 10/05/19 1707          How much help from another person do you currently need...    Turning from your back to your side while in flat bed without using bedrails?  3  -PC     Moving from lying on back to sitting on the side of a flat bed without bedrails?  3  -PC     Moving to and from a bed to a chair (including a wheelchair)?  2  -PC     Standing up from a chair using your arms (e.g., wheelchair, bedside chair)?  2  -PC     Climbing 3-5 steps with a railing?  1  -PC     To walk in hospital room?  2  -PC     AM-PAC 6 Clicks Score (PT)  13  -PC     Row Name 10/05/19 1707          Functional Assessment    Outcome Measure Options  AM-PAC 6 Clicks Basic Mobility (PT)  -PC       User Key  (r) =  Recorded By, (t) = Taken By, (c) = Cosigned By    Initials Name Provider Type    PC Chioma Lewis PT Physical Therapist        Physical Therapy Education     Title: PT OT SLP Therapies (In Progress)     Topic: Physical Therapy (In Progress)     Point: Mobility training (In Progress)     Learning Progress Summary           Patient Acceptance, E,D, NR by PC at 10/5/2019  5:05 PM                   Point: Home exercise program (In Progress)     Learning Progress Summary           Patient Acceptance, E,D, NR by PC at 10/5/2019  5:05 PM                   Point: Body mechanics (In Progress)     Learning Progress Summary           Patient Acceptance, E,D, NR by PC at 10/5/2019  5:05 PM                   Point: Precautions (In Progress)     Learning Progress Summary           Patient Acceptance, E,D, NR by PC at 10/5/2019  5:05 PM                               User Key     Initials Effective Dates Name Provider Type Discipline     04/03/18 -  Chioma Lewis PT Physical Therapist PT              PT Recommendation and Plan     Outcome Summary/Treatment Plan (PT)  Anticipated Equipment Needs at Discharge (PT): front wheeled walker  Anticipated Discharge Disposition (PT): skilled nursing facility  Plan of Care Reviewed With: patient  Outcome Summary: pt presents with impaired functional mobility and activity tolerance, she will benefit from PT to address.  At baseline pt is independent with cane, lives with granddaughter. Pt was extremely weak today, needed mod a x 2 to walk a short distance, gait was very unsteady with poor quality, pt may need short SNF stay prior to going to her home     Time Calculation:   PT Charges     Row Name 10/05/19 1708 10/05/19 1630          Time Calculation    Start Time  1630  -PC  --     Stop Time  1645  -PC  --     Time Calculation (min)  15 min  -PC  --     PT Received On  10/05/19  -PC  --     PT - Next Appointment  10/07/19  -PC  10/05/19  -PC     PT Goal Re-Cert Due Date  10/12/19  -PC   --       User Key  (r) = Recorded By, (t) = Taken By, (c) = Cosigned By    Initials Name Provider Type    PC Chioma Lewis, PT Physical Therapist        Therapy Charges for Today     Code Description Service Date Service Provider Modifiers Qty    87083381178 HC PT EVAL MOD COMPLEXITY 2 10/5/2019 Chioma Lewis, PT GP 1    41262791092 HC PT THER PROC EA 15 MIN 10/5/2019 Chioma Lewis, PT GP 1    72683489134 HC PT THER SUPP EA 15 MIN 10/5/2019 Chioma Lewis, PT GP 1          PT G-Codes  Outcome Measure Options: AM-PAC 6 Clicks Basic Mobility (PT)  AM-PAC 6 Clicks Score (PT): 13    Chioma Lewis PT  10/5/2019

## 2019-10-05 NOTE — NURSING NOTE
Blood sugar 66, pt speaking to RN but falls asleep easily ( no change from am assessment.). D50 IV given, see MAR. Will recheck blood sugar per protocol. Paged Dr. Griffin    9201- blood sugar 139

## 2019-10-05 NOTE — PROGRESS NOTES
Name: Lindsay Martinez ADMIT: 10/1/2019   : 1930  PCP: Ronda Centeno MD    MRN: [id number] LOS: 3 days   AGE/SEX: 89 y.o. female  ROOM: [facility]     Subjective   Subjective   CC: dyspnea  No acute events.  Patient has no new complaints.  Had to go in restraints last night and this AM but is out now. Was lethargic and had an episode of hypoglycemia but this is better now.  No CP/f/c/n/v/d.    Objective   Objective   Vital Signs  Temp:  [97.5 °F (36.4 °C)-98.4 °F (36.9 °C)] 98.4 °F (36.9 °C)  Heart Rate:  [64-77] 70  Resp:  [14] 14  BP: (107-139)/(52-72) 122/63  SpO2:  [94 %-98 %] 97 %  on   ;   Device (Oxygen Therapy): room air  Body mass index is 25.33 kg/m².  Physical Exam   Constitutional: She appears well-developed and well-nourished. No distress.   HENT:   Head: Normocephalic and atraumatic.   Mouth/Throat: Oropharynx is clear and moist.   Eyes: Conjunctivae and EOM are normal. Pupils are equal, round, and reactive to light.   Neck: Normal range of motion. Neck supple. No JVD present.   Cardiovascular: Normal rate, regular rhythm and intact distal pulses.   Pulmonary/Chest: Effort normal. No respiratory distress. She has rales (minimal, right base).   Abdominal: Soft. Bowel sounds are normal. There is no tenderness.   Musculoskeletal: Normal range of motion. She exhibits no edema.   Neurological: She is alert. She is disoriented.   Skin: Skin is warm and dry. Capillary refill takes less than 2 seconds.   Psychiatric: She has a normal mood and affect. Her behavior is normal.   Nursing note and vitals reviewed.      Results Review:       I reviewed the patient's new clinical results.  Results from last 7 days   Lab Units 10/05/19  0426 10/04/19  0509 10/02/19  0655 10/01/19  1403   WBC 10*3/mm3 4.90 5.05 4.87 6.19   HEMOGLOBIN g/dL 11.2* 11.5* 11.4* 11.0*   PLATELETS 10*3/mm3 186 211 211 217     Results from last 7 days   Lab Units 10/05/19  0426 10/04/19  0509 10/02/19  0655 10/01/19  1403   SODIUM  mmol/L 145 141 142 142   POTASSIUM mmol/L 3.9 4.1 3.7 4.6   CHLORIDE mmol/L 105 101 101 104   CO2 mmol/L 26.4 28.6 29.0 23.9   BUN mg/dL 34* 29* 23 21   CREATININE mg/dL 1.30* 1.38* 1.19* 1.11*   GLUCOSE mg/dL 83 136* 131* 137*   Estimated Creatinine Clearance: 23.5 mL/min (A) (by C-G formula based on SCr of 1.3 mg/dL (H)).  Results from last 7 days   Lab Units 10/02/19  0655 10/01/19  1403   ALBUMIN g/dL 4.10 4.10   BILIRUBIN mg/dL 0.6 0.5   ALK PHOS U/L 118* 113   AST (SGOT) U/L 13 16   ALT (SGPT) U/L 5 6     Results from last 7 days   Lab Units 10/05/19  0426 10/04/19  0509 10/02/19  0655 10/01/19  1403   CALCIUM mg/dL 8.5* 9.0 9.1 8.8   ALBUMIN g/dL  --   --  4.10 4.10     Results from last 7 days   Lab Units 10/02/19  0655 10/01/19  1403   PROCALCITONIN ng/mL 0.07* 0.06*   LACTATE mmol/L 1.3  --      Glucose   Date/Time Value Ref Range Status   10/05/2019 1614 92 70 - 130 mg/dL Final   10/05/2019 1116 139 (H) 70 - 130 mg/dL Final   10/05/2019 1049 66 (L) 70 - 130 mg/dL Final   10/05/2019 0619 81 70 - 130 mg/dL Final   10/04/2019 2049 100 70 - 130 mg/dL Final   10/04/2019 1720 106 70 - 130 mg/dL Final   10/04/2019 1316 130 70 - 130 mg/dL Final         aspirin 81 mg Oral Daily   atorvastatin 10 mg Oral Daily   donepezil 5 mg Oral Daily   enoxaparin 30 mg Subcutaneous Q24H   ferrous sulfate 325 mg Oral Daily With Breakfast   furosemide 40 mg Oral Daily   insulin lispro 0-9 Units Subcutaneous 4x Daily With Meals & Nightly   magnesium oxide 400 mg Oral BID   melatonin 5 mg Oral Nightly   metoprolol succinate XL 12.5 mg Oral Daily   mirtazapine 7.5 mg Oral Nightly   potassium chloride 20 mEq Oral Daily   sodium chloride 10 mL Intravenous Q12H       hold 1 each   Diet Regular; Cardiac, Consistent Carbohydrate, Renal       Assessment/Plan     Active Hospital Problems    Diagnosis  POA   • **Acute on chronic combined systolic and diastolic CHF (congestive heart failure) (CMS/[facility]) [I50.43]  Yes   • Delirium, acute  [R41.0]  Yes   • Hyperparathyroidism (CMS/HCC) [E21.3]  Yes   • Type 2 diabetes mellitus without complication, without long-term current use of insulin (CMS/HCC) [E11.9]  Yes   • Chronic venous insufficiency [I87.2]  Yes   • HTN (hypertension) [I10]  Yes      Resolved Hospital Problems   No resolved problems to display.   Acute on Chronic Combined systolic/diastolic CHF  - volume status looks much better  - continue on oral lasix  - monitor I&O and chemistries  - had right thoracentesis 10/4/19 with 650cc or transudate  - appreciate cardiology    Right pleural effusion  - s/p thoracentesis   - this was transudate and likely cardiac cause  - appreciate pulm recs     Confusion  - likely delirium/sundowning at this point  - continue scheduled melatonin  - not much effect from zyprexa, will stop this and schedule abilify nightly  - lights on, drapes open during the day  - reorient as needed     Type 2 DM  - continue coverage with ssi/hypoglycemia protocol     HTN  - BP acceptable  - midodrine stopped earlier in the admission  - continue lasix and metoprolol    DVT Prophylaxis  - Lovenox 30mg daily    Disposition  - home with granddaughter and HH in the next couple days     Joel Griffin MD  [facility]  10/05/19  6:01 PM

## 2019-10-05 NOTE — PLAN OF CARE
Problem: Restraint, Nonbehavioral (Nonviolent)  Goal: Rationale and Justification  Outcome: Ongoing (interventions implemented as appropriate)  Pt was agitated when aroused upon am assessment and throughout the morning.   Goal: Nonbehavioral (Nonviolent) Restraint: Absence of Injury/Harm  Outcome: Ongoing (interventions implemented as appropriate)    Goal: Nonbehavioral (Nonviolent) Restraint: Achievement of Discontinuation Criteria  Outcome: Ongoing (interventions implemented as appropriate)    Goal: Nonbehavioral (Nonviolent) Restraint: Preservation of Dignity and Wellbeing  Outcome: Ongoing (interventions implemented as appropriate)

## 2019-10-05 NOTE — PROGRESS NOTES
Reviewed chart. Patient lying in bed quietly at this time. Has been tired and not wanting to wake up. She was awake and stated she doesn't feel so good. She is being treated for plural effusion. She was not able to state where she is. Blood glucose has been up and down.

## 2019-10-06 PROBLEM — R41.0 DELIRIUM, ACUTE: Status: RESOLVED | Noted: 2019-01-01 | Resolved: 2019-01-01

## 2019-10-06 NOTE — DISCHARGE PLACEMENT REQUEST
"Lindsay Street (89 y.o. Female)     Date of Birth Social Security Number Address Home Phone MRN    1930  [facility] [place] [address] [address] [phone] [id number]    Scientologist Marital Status          Sikh        Admission Date Admission Type Admitting Provider Attending Provider Department, Room/Bed    10/1/19 Emergency Rosa Bobo MD Lykins, Matthew D, MD [address], E456/1    Discharge Date Discharge Disposition Discharge Destination                       Attending Provider:  Joel Griffin MD    Allergies:  Latex    Isolation:  None   Infection:  None   Code Status:  CPR    Ht:  152.4 cm (60\")   Wt:  59.3 kg (130 lb 12.8 oz)    Admission Cmt:  None   Principal Problem:  Acute on chronic combined systolic and diastolic CHF (congestive heart failure) (CMS/HCC) [I50.43]                 Active Insurance as of 10/1/2019     Primary Coverage     Payor Plan Insurance Group Employer/Plan Group    MEDICARE RAILROAD MEDICARE      Payor Plan Address Payor Plan Phone Number Payor Plan Fax Number Effective Dates    [address] [phone]  1995 - None Entered    [facility] [id number]       Subscriber Name Subscriber Birth Date Member ID       [address] 1930 [id number]           Secondary Coverage     Payor Plan Insurance Group Employer/Plan Group    [facility] SUPP [id number]     Payor Plan Address Payor Plan Phone Number Payor Plan Fax Number Effective Dates    [address]   2016 - None Entered    [facility] [id number]       Subscriber Name Subscriber Birth Date Member ID       [address] 1930 [id number]                 Emergency Contacts      (Rel.) Home Phone Work Phone Mobile Phone    LEANDRO MORILLO (Daughter) -- -- [phone]    ADITHYA STREET (Son) -- -- [phone]              "

## 2019-10-06 NOTE — PROGRESS NOTES
Continued Stay Note  [facility]     Patient Name: Lindsay Martinez  MRN: [id number]  Today's Date: 10/6/2019    Admit Date: 10/1/2019    Discharge Plan     Row Name 10/06/19 1358       Plan    Plan  Home with hieu 24/7 and Pending sale to [facility]     Patient/Family in Agreement with Plan  yes    Plan Comments  Incoming call from RAJ Brooks asking CCP to talk with family in room re: discharge plan and home needs.  Met with patient, sonKevrin and his wife and Jennifer hagen Kennedy at bedside and explained that Physical therapy is recommending patient go to SNF due to decreased mobilty and weakness.  Son is adamant that patient not go to SNF as she had a very bad experience at last rehab she was at and she tends to get worse when out of familiar surroundings.  Son and Jennifer hagen states Jennifer will be with patient 24/7 at home and they can care for her at home.  Informed son that patient can go to SNF up to 30 days after discharge if they decide for her to go and they can have [facility] assist them with this and he verbalized understanding.  CCP provided son with In Home Private Pay Personal Care provider list.  Family are requesting a hospital bed and understands that Medicare will not cover cost of this and they can rent a full electric hospital bed at [facility] for $165/month and they will have to pay 1st monthy up front prior to delivery and son is agreeable to this.  Family also requests a rolling walker, 3-1 commode and a standard wheelchair from [facility].  DME noted for hospital bed, rolling walker, 3-1 commode and standard wheelchair and Concepción with [facility] notified of DME needs and states they can deliver to patient's home this evening around 8p-9p and SonKervin was informed of delivery estimated time and is agreeable.  Son states he, his wife and his daughter will be providing transportation for patient at discharge today.  Left voicemail with Noemi at [facility] at [phone] to inform of  patient's discharge home today.  Updated RAJ Brooks on DME ordered and CCP has informed [facility] of patients discharge and that family will provide transportation for patient at discharge... Myranda Boyer RN,CCP         Discharge Codes    No documentation.       Expected Discharge Date and Time     Expected Discharge Date Expected Discharge Time    Oct 6, 2019             Myranda Boyer RN

## 2019-10-06 NOTE — PROGRESS NOTES
Reviewed chart. Met w/ patient and her granddaughter. She has been out of restraints. She is pleasant. She has some periods of being close to her cognitive baseline and other times more forgetful and confused. Granddaughter feels this is related to having had the sedative and agrees that her being out of her regular routine. Granddaughter does not believe that patient needs to be on CMU. That she is improving. Patient was pleasant and did answer questions.  Will follow.

## 2019-10-06 NOTE — PROGRESS NOTES
Lindsay Martinez   89 y.o.  female    LOS: 4 days   Patient Care Team:  Ronda Centeno MD as PCP - General (Family Medicine)      Subjective   More awake and oriented today  Interval History:     Patient Complaints:     Review of Systems:       Medication Review:   Current Facility-Administered Medications:   •  acetaminophen (TYLENOL) tablet 650 mg, 650 mg, Oral, Q4H PRN, 350 mg at 10/03/19 2146 **OR** acetaminophen (TYLENOL) 160 MG/5ML solution 650 mg, 650 mg, Oral, Q4H PRN **OR** acetaminophen (TYLENOL) suppository 650 mg, 650 mg, Rectal, Q4H PRN, Rosa Bobo MD  •  ARIPiprazole (ABILIFY) tablet 5 mg, 5 mg, Oral, Nightly, Joel Griffin MD, 5 mg at 10/05/19 2302  •  aspirin EC tablet 81 mg, 81 mg, Oral, Daily, Rosa Bobo MD, 81 mg at 10/06/19 0829  •  atorvastatin (LIPITOR) tablet 10 mg, 10 mg, Oral, Daily, Rosa Bobo MD, 10 mg at 10/06/19 0829  •  dextrose (D50W) 25 g/ 50mL Intravenous Solution 25 g, 25 g, Intravenous, Q15 Min PRN, Rosa Bobo MD, 25 g at 10/05/19 1057  •  dextrose (GLUTOSE) oral gel 15 g, 15 g, Oral, Q15 Min PRN, Rosa Bobo MD  •  donepezil (ARICEPT) tablet 5 mg, 5 mg, Oral, Daily, Rosa Bobo MD, 5 mg at 10/06/19 0829  •  enoxaparin (LOVENOX) syringe 30 mg, 30 mg, Subcutaneous, Q24H, Rosa Bobo MD, 30 mg at 10/05/19 2202  •  ferrous sulfate tablet 325 mg, 325 mg, Oral, Daily With Breakfast, Rosa Bobo MD, 325 mg at 10/06/19 0829  •  furosemide (LASIX) tablet 40 mg, 40 mg, Oral, Daily, Tri Valentino APRN, 40 mg at 10/06/19 0829  •  glucagon (human recombinant) (GLUCAGEN DIAGNOSTIC) injection 1 mg, 1 mg, Subcutaneous, Q15 Min PRN, Rosa Bobo MD  •  Hold medication, 1 each, Does not apply, Continuous PRN, Familia Li MD  •  insulin lispro (humaLOG) injection 0-9 Units, 0-9 Units, Subcutaneous, 4x Daily With Meals & Nightly, Rosa Bobo MD, Stopped at 10/06/19 0926  •  magnesium oxide (MAG-OX) tablet 400 mg, 400 mg, Oral, BID, Rosa Bobo MD, 400 mg at 10/06/19  0829  •  melatonin tablet 5 mg, 5 mg, Oral, Nightly, Joel Griffin MD, 5 mg at 10/05/19 2202  •  metoprolol succinate XL (TOPROL-XL) 24 hr tablet 12.5 mg, 12.5 mg, Oral, Daily, Rosa Bobo MD, 12.5 mg at 10/06/19 0829  •  mirtazapine (REMERON) tablet 7.5 mg, 7.5 mg, Oral, Nightly, Rosa Bobo MD, 7.5 mg at 10/05/19 2202  •  nitroglycerin (NITROSTAT) SL tablet 0.4 mg, 0.4 mg, Sublingual, Q5 Min PRN, Rosa Bobo MD  •  ondansetron (ZOFRAN) injection 4 mg, 4 mg, Intravenous, Q6H PRN, Rosa Bobo MD  •  potassium chloride (MICRO-K) CR capsule 20 mEq, 20 mEq, Oral, Daily, Joel Griffin MD, 20 mEq at 10/06/19 0829  •  [COMPLETED] Insert peripheral IV, , , Once **AND** sodium chloride 0.9 % flush 10 mL, 10 mL, Intravenous, PRN, Sophia Hill, APRN  •  sodium chloride 0.9 % flush 10 mL, 10 mL, Intravenous, Q12H, Rosa Bobo MD, 10 mL at 10/05/19 2204  •  sodium chloride 0.9 % flush 10 mL, 10 mL, Intravenous, PRN, Rosa Bobo MD      Objective   Vital Sign Min/Max for last 24 hours  Temp  Min: 97.5 °F (36.4 °C)  Max: 99 °F (37.2 °C)   BP  Min: 106/72  Max: 139/61    Pulse  Min: 68  Max: 78     Wt Readings from Last 3 Encounters:   10/06/19 59.3 kg (130 lb 12.8 oz)   08/19/19 59.9 kg (132 lb)   07/30/19 62.2 kg (137 lb 3.2 oz)        Intake/Output Summary (Last 24 hours) at 10/6/2019 1002  Last data filed at 10/5/2019 1804  Gross per 24 hour   Intake 370 ml   Output 350 ml   Net 20 ml     Physical Exam:      General Appearance:    Well developed and well nourished in no acute distress   Head:    Normocephalic, atraumatic   Eyes:            Conjunctivae normal, non icteric, no xanthelasma   Neck:   supple, trachea midline, no thyromegaly, no carotid bruit, no JVD, no elevated CVP   Lungs:     Clear to auscultation,respirations regular, even and                  unlabored    Heart:    Regular rhythm and normal rate, normal S1 and S2,            No murmur, no gallop, no rub, no click   Chest Wall:     No abnormalities observed   Abdomen:     Normal bowel sounds, no masses, no organomegaly, soft        non-tender, non-distended, no guarding, no rebound                tenderness   Rectal:     Deferred   Extremities:   No edema. Moves all extremities well, no cyanosis, no erythema   Pulses:   Pulses palpable and equal bilaterally   Skin:   No bleeding, bruising or rash   Neurologic:   awake alert and oriented x3, speech clear and approp, no facial drooping     :    Monitor:      Results Review:         Sodium Sodium   Date Value Ref Range Status   10/05/2019 145 136 - 145 mmol/L Final   10/04/2019 141 136 - 145 mmol/L Final      Potassium Potassium   Date Value Ref Range Status   10/05/2019 3.9 3.5 - 5.2 mmol/L Final   10/04/2019 4.1 3.5 - 5.2 mmol/L Final      Chloride Chloride   Date Value Ref Range Status   10/05/2019 105 98 - 107 mmol/L Final   10/04/2019 101 98 - 107 mmol/L Final      Bicarbonate No results found for: PLASMABICARB   BUN BUN   Date Value Ref Range Status   10/05/2019 34 (H) 8 - 23 mg/dL Final   10/04/2019 29 (H) 8 - 23 mg/dL Final      Creatinine Creatinine   Date Value Ref Range Status   10/05/2019 1.30 (H) 0.57 - 1.00 mg/dL Final   10/04/2019 1.38 (H) 0.57 - 1.00 mg/dL Final      Calcium Calcium   Date Value Ref Range Status   10/05/2019 8.5 (L) 8.6 - 10.5 mg/dL Final   10/04/2019 9.0 8.6 - 10.5 mg/dL Final      Magnesium No results found for: MG     Results from last 7 days   Lab Units 10/05/19  0426   WBC 10*3/mm3 4.90   HEMOGLOBIN g/dL 11.2*   HEMATOCRIT % 34.9   PLATELETS 10*3/mm3 186     Lab Results   Lab Value Date/Time    TROPONINT <0.010 10/01/2019 1403     No results found for: CHOL  Lab Results   Component Value Date    HDL 58 07/30/2019    HDL 43 (L) 03/26/2018     Lab Results   Component Value Date    LDL 89 07/30/2019    LDL 65 03/26/2018     Lab Results   Component Value Date    TRIG 123 07/30/2019    TRIG 56 03/26/2018     No components found for: CHOLHDL  PTT   Date Value Ref  Range Status   10/04/2019 29.3 22.7 - 35.4 seconds Final     No components found for: PT/INR  Lab Results   Component Value Date    HGBA1C 5.40 10/02/2019      Lab Results   Component Value Date    TSH 0.577 03/27/2018        Echo EF Estimated  No results found for: ECHOEFEST      Assessment/ Plan    Active Hospital Problems    Diagnosis POA   • **Acute on chronic combined systolic and diastolic CHF (congestive heart failure) (CMS/[facility]) [I50.43] Yes   • Delirium, acute [R41.0] Yes   • Hyperparathyroidism (CMS/[facility]) [E21.3] Yes   • Type 2 diabetes mellitus without complication, without long-term current use of insulin (CMS/[facility]) [E11.9] Yes   • Chronic venous insufficiency [I87.2] Yes   • HTN (hypertension) [I10] Yes     1. Acute Diastolic CHF, euvolemic  ECHO: March 26, 2018: at [facility], EF 61%, moderate to severe left atrial enlargement, mild to moderate mitral stenosis,   Status post thoracentesis  Status post pacemaker  DC soon        Nithin Pedro MD  10/06/19  10:02 AM

## 2019-10-06 NOTE — PROGRESS NOTES
[facility]      Mar/chart reviewed,   Follow up pleural effusion.   Says she is weak has some shortness of breath with activity    Vital Sign Min/Max for last 24 hours  Temp  Min: 97.5 °F (36.4 °C)  Max: 99 °F (37.2 °C)   BP  Min: 106/72  Max: 139/61   Pulse  Min: 68  Max: 78   Resp  Min: 14  Max: 15   SpO2  Min: 93 %  Max: 98 %   No Data Recorded   Weight  Min: 59.3 kg (130 lb 12.8 oz)  Max: 59.3 kg (130 lb 12.8 oz)   370/350?  Appears ill , alert, oriented times 2   no palpable thyroid nodules, normal conjunctiva, eomi, no icterus, perrl  mmm, no jvd, trachea midline, neck supple,  chest cta bilaterally, no crackles, no wheezes,   rrr,   soft, nt, nd +bs,  no c/c/ e  Skin warm, dry, no rashes    PCCM Problems  Right lower lobe infiltrate  Right lower lobe effusion  Recent cardiac events  Confusion and disorientation  Perhaps baseline dementia  Relevant Medical Diagnoses  CAD  Recent pacemaker  Recent cardiac arrest     Plan of Treatment  Continue efforts at diuresis. Agree effusion appears transudate.  Would try pretty hard to avoid pleurix catheter placement in 88yo    Ok with me to d/c follow up with LPC in 4 weeks in office with CXR.

## 2019-10-06 NOTE — DISCHARGE SUMMARY
Date of Admission: 10/1/2019  Date of Discharge:  10/6/2019  Primary Care Physician: Ronda Centeno MD     Discharge Diagnosis:  Active Hospital Problems    Diagnosis  POA   • **Acute on chronic combined systolic and diastolic CHF (congestive heart failure) (CMS/[facility]) [I50.43]  Yes   • Hyperparathyroidism (CMS/[facility]) [E21.3]  Yes   • Type 2 diabetes mellitus without complication, without long-term current use of insulin (CMS/[facility]) [E11.9]  Yes   • Chronic venous insufficiency [I87.2]  Yes   • HTN (hypertension) [I10]  Yes      Resolved Hospital Problems    Diagnosis Date Resolved POA   • Delirium, acute [R41.0] 10/06/2019 Yes       Presenting Problem/History of Present Illness:  Acute on chronic combined systolic and diastolic CHF (congestive heart failure) (CMS/HCC) [I50.43]  Acute on chronic combined systolic and diastolic CHF (congestive heart failure) (CMS/[facility]) [I50.43]     Hospital Course:  The patient is a 89 y.o. female with a history of dementia, type 2 DM, and chronic combined systolic/diastolic CHF who presented with increased confusion and shortness of breath.  Please see admission H&P from 10/1/19 for further details.  She was found to be in acute on chronic congestive heart failure and was started on diuretics which she tolerated well.  She did have a right-sided pleural effusion which is thought to be related to heart failure and she had a thoracentesis on 10/4/19 which yielded 650cc of transudate.  Her follow up chest xray and symptoms were greatly improved following this.  She was transitioned to oral lasix and remained stable on this.  She will be discharged home on her new dose of oral lasix. She should keep follow up with her PCP and cardiologist.  Pulmonology saw her regarding the pleural effusion and she should follow up with them in about a month with a chest xray.   The patient did have some delirium with sun-downing which was rather severe at times. She was given zyprexa without much benefit.  " Abilify did seem to help but this will be stopped at the time of discharge.  Per the patient's family she reacts very poorly in this regard to hospitalization and she had very similar issues when she was in rehab previously.      She is very weak from this episode and her underlying conditions and I did discuss the possibility of and recommendation for rehab based on this.  The patient's son is adamant that he does not want the patient to go to rehab and he would rather the patient return home.  I did discuss with the son and granddaughter and there will be someone with her 24 hours a day and they will be able to help her with mobility along with home health.  Due to immobility related to CHF a standard wheelchair is needed to assist patient with daily living activities inside home.  Patient is unable to safely use a cane or walker and will be using a walker for gait training. Patient has upper body strength and mental capability to propel the wheelchair inside the home.    Of note, she has some sub-centimeter pulmonary nodules incidentally noted on CT scan.  If desired, this could be followed up in 6-12 months to ensure stability.    Exam Today:  Blood pressure 125/57, pulse 68, temperature 98 °F (36.7 °C), temperature source Oral, resp. rate 15, height 152.4 cm (60\"), weight 59.3 kg (130 lb 12.8 oz), SpO2 93 %.  Constitutional: She appears well-developed and well-nourished. No distress.   Head: Normocephalic and atraumatic.   Mouth/Throat: Oropharynx is clear and moist.   Eyes: Conjunctivae and EOM are normal. Pupils are equal, round, and reactive to light.   Neck: Normal range of motion. Neck supple. No JVD present.   Cardiovascular: Normal rate, regular rhythm and intact distal pulses.   Pulmonary/Chest: Effort normal. No respiratory distress. She has minimal right basilar rales.  Abdominal: Soft. Bowel sounds are normal. There is no tenderness.   Musculoskeletal: Normal range of motion. She exhibits no edema. "   Neurological: She is alert. She is oriented to self. Cooperative.   Skin: Skin is warm and dry. Capillary refill takes less than 2 seconds.   Psychiatric: She has a normal mood and affect. Her behavior is normal.   Nursing note and vitals reviewed.    Procedures Performed:  ULTRASOUND-GUIDED THORACENTESIS performed on 10/04/2019.   HISTORY: 89-year-old female with shortness of breath.  FINDINGS: The patient was brought to the ultrasound suite and placed in  the seated upright position. A free-flowing pocket of pleural fluid was  identified in the right pleural space. The area was then prepped and  draped in the usual sterile fashion. The skin was anesthetized with 1%  Lidocaine without epinephrine.  Thoracentesis was then achieved using a  5F GPMESS needle system. Approximately 650 mL of clear juliet  fluid was  obtained. A portion was sent to the lab for pre-ordered studies. The  procedure was then ended with no immediate complications or patient  distress.     IMPRESSION:  Successful ultrasound-guided thoracentesis of the right  pleural space.     650 mL of clear juliet fluid obtained.    CT CHEST WITHOUT IV CONTRAST-10/3/19     HISTORY: Pleural effusion, follow-up.     TECHNIQUE: Radiation dose reduction techniques were utilized, including  automated exposure control and exposure modulation based on body size.   3 mm images were obtained through the chest without IV contrast.      COMPARISON: CT abdomen and pelvis 05/09/2019.     FINDINGS:     Hypodense lesions are scattered throughout the thyroid and likely of no  clinical consequence in a patient of this age.     There is a left-sided pacemaker. There is a small pericardial effusion.     Cystic density lesion within the left kidney likely represents a simple  cyst. There is a small hiatal hernia.     There is no mediastinal or axillary adenopathy by size criteria.     The heart is normal in size. Severe coronary artery calcification is  present.     Bilateral  pleural effusions are present, moderate to large on the right  and small on the left, with overlying pulmonary opacification favored to  represent atelectasis.. There is hazy pulmonary opacification throughout  the bilateral lungs with superimposed intralobular septal thickening.  There are approximately 3 sub-6 mm pulmonary nodules within the  bilateral lungs measuring up to 0.5 cm in the superior aspect of the  left lower lobe.     Multiple old right rib fractures are present. No suspicious lytic or  blastic osseous lesions are present.     IMPRESSION:  1.  Findings of probable pulmonary edema with moderate right and small  left pleural effusions. The pulmonary opacification within the bilateral  lung bases likely represents atelectasis with pneumonia being less  likely. Correlation with patient history is recommended to exclude this  possibility.  2.  Small pericardial effusion.  3.  Sub-6 mm pulmonary nodules bilaterally, as above. If this patient is  at increased risk for lung cancer, follow-up chest CT in 12 months can  be considered to ensure stability. If this patient is not at increased  risk for lung cancer, no further follow-up is necessary per Fleischner  criteria.  4.  Other findings as above.     NONCONTRAST HEAD CT 10/02/2019     CLINICAL HISTORY: Confusion.     TECHNIQUE: Spiral CT images were obtained from the base of the skull to  the vertex without intravenous contrast. Images were reformatted and  submitted in 3 mm thick axial CT sections with brain algorithm; 2 mm  thick sagittal and coronal reconstructions were performed and submitted  in brain algorithm.     COMPARISON: There are no prior studies from [facility]  for comparison.     FINDINGS: There are patchy and confluent areas of low density extending  from the periventricular into the subcortical white matter of the  cerebral hemispheres consistent with moderate small vessel disease. The  remainder of the brain parenchyma is  normal in attenuation. The  ventricles are normal in size. I see no focal mass effect and no midline  shift and no extraaxial fluid collections are identified and there is no  evidence of acute intracranial hemorrhage. There are prominent calcified  atherosclerotic plaques in the intracranial segments of the distal  vertebral arteries and cavernous and supracavernous segments of the  internal carotid arteries bilaterally. The right maxillary sinus is  partially opacified with mucosal thickening and central chronic  partially-calcified inspissated secretions of bony thickening in the  walls of the right maxillary sinus from chronic right maxillary sinus  disease. The remainder of the paranasal sinuses, mastoid air cells and  middle ear cavities are clear.     IMPRESSION:  1. There is moderate to severe small vessel disease in the cerebral  white matter and there are prominent calcified atherosclerotic plaques  in the intracranial segments of the distal vertebral arteries and the  cavernous and supracavernous segments of the internal carotid arteries  bilaterally and there is mild cerebral atrophy.  2. Partial opacification of the right maxillary sinus with mucosal  thickening and central chronic partially-calcified and chronic  inspissated secretions of bony thickening in the walls of the right  maxillary sinus from chronic right maxillary sinus disease. The  remainder of the head CT is within normal limits. The etiology of the  acute onset confusion is not clearly established on this exam. Please  correlate clinically.  Consults:   Consults     Date and Time Order Name Status Description    10/2/2019 1537 Inpatient Pulmonology Consult Completed     10/2/2019 0927 Inpatient Cardiology Consult      10/1/2019 1743 [facility] (on-call MD unless specified) Details Completed     10/1/2019 1654 Cardiology (on-call MD unless specified) Completed            Discharge Disposition:  Home or Self Care    Discharge Medications:      Discharge Medications      Changes to Medications      Instructions Start Date   ferrous sulfate 325 (65 FE) MG tablet  Commonly known as:  IRON SUPPLEMENT  What changed:  additional instructions   325 mg, Oral, Daily With Breakfast      furosemide 40 MG tablet  Commonly known as:  LASIX  What changed:    · medication strength  · how much to take   40 mg, Oral, Daily         Continue These Medications      Instructions Start Date   aspirin 81 MG EC tablet   81 mg, Oral, Daily      donepezil 5 MG tablet  Commonly known as:  ARICEPT   5 mg, Oral, Daily      magnesium oxide 400 (241.3 Mg) MG tablet tablet  Commonly known as:  MAGOX   400 mg, Oral, 2 Times Daily      melatonin 5 MG tablet tablet   10 mg, Oral, Nightly PRN      metoprolol succinate XL 25 MG 24 hr tablet  Commonly known as:  TOPROL-XL   12.5 mg, Oral, Daily      mirtazapine 7.5 MG tablet  Commonly known as:  REMERON   7.5 mg, Oral, Nightly      potassium chloride 20 MEQ CR tablet  Commonly known as:  K-DUR,KLOR-CON   20 mEq, Oral, Daily      simvastatin 20 MG tablet  Commonly known as:  ZOCOR   20 mg, Oral, Nightly         Stop These Medications    midodrine 10 MG tablet  Commonly known as:  PROAMATINE            Discharge Diet:   Diet Instructions     Diet: Cardiac, Consistent Carbohydrate; Thin      Discharge Diet:   Cardiac  Consistent Carbohydrate       Fluid Consistency:  Thin          Activity at Discharge:   Activity Instructions     Activity as Tolerated            Follow-up Appointments:  No future appointments.  Additional Instructions for the Follow-ups that You Need to Schedule     Ambulatory Referral to Home Health   As directed      Face to Face Visit Date:  10/6/2019    Follow-up provider for Plan of Care?:  I treated the patient in an acute care facility and will not continue treatment after discharge.    Follow-up provider:  JEANIE FITCH [[id number]]    Reason/Clinical Findings:  weakness/immobility/chf    Describe mobility limitations  that make leaving home difficult:  weakness/immobility/chf/dementia    Nursing/Therapeutic Services Requested:  Skilled Nursing Physical Therapy    Skilled nursing orders:  CHF management    PT orders:  Strengthening Transfer training    Frequency:  1 Week 1         Discharge Follow-up with PCP   As directed       Currently Documented PCP:    Ronda Centeno MD    PCP Phone Number:    [phone]     Follow Up Details:  1 week         Discharge Follow-up with Specialty: Pulmonology ([facility]); 1 Month   As directed      Specialty:  Pulmonology ([facility])    Follow Up:  1 Month               Test Results Pending at Discharge:   Order Current Status    Non-gynecologic Cytology Collected (10/04/19 1230)    Body Fluid Culture - Body Fluid, Pleural Cavity Preliminary result           Joel Griffin MD  10/06/19  2:41 PM    Time Spent on Discharge Activities: Greater than 30 minutes.

## 2019-10-06 NOTE — PLAN OF CARE
Problem: Patient Care Overview  Goal: Plan of Care Review  Outcome: Ongoing (interventions implemented as appropriate)   10/06/19 0439   Coping/Psychosocial   Plan of Care Reviewed With patient   OTHER   Outcome Summary no compliants of pain, pt pleasant and cooperative out of restraints, VSS, will continue to monitor    Plan of Care Review   Progress improving       Problem: Fall Risk (Adult)  Goal: Absence of Fall  Outcome: Ongoing (interventions implemented as appropriate)      Problem: Skin Injury Risk (Adult)  Goal: Skin Health and Integrity  Outcome: Ongoing (interventions implemented as appropriate)

## 2019-10-07 NOTE — PROGRESS NOTES
Case Management Discharge Note    Final Note: pt dc'd to home with ANGELA SMITH to follow and DME from [place] 10/6/19    Destination      No service has been selected for the patient.      Durable Medical Equipment - Selection Complete      Service Provider Request Status Selected Services Address Phone Number Fax Number    [facility] [place] Selected Durable Medical Equipment [id number], [facility] [id number] [phone]      Dialysis/Infusion      No service has been selected for the patient.      Home Medical Care - Selection Complete      Service Provider Request Status Selected Services Address Phone Number Fax Number    [facility] Selected Home Health Services [address] [place] [address] [id number] [phone]      Therapy      No service has been selected for the patient.      Community Resources      No service has been selected for the patient.        Transportation Services  Private: Car    Final Discharge Disposition Code: 06 - home with home health care

## 2019-10-08 PROBLEM — F03.918 DEMENTIA WITH BEHAVIORAL DISTURBANCE (HCC): Status: ACTIVE | Noted: 2019-01-01

## 2019-10-08 NOTE — OUTREACH NOTE
Prep Survey      Responses   Facility patient discharged from?  [place]   Is patient eligible?  Yes   Discharge diagnosis  a/c CHF   Does the patient have one of the following disease processes/diagnoses(primary or secondary)?  CHF   Does the patient have Home health ordered?  Yes   What is the Home health agency?   [facility]   Is there a DME ordered?  Yes   What DME was ordered?  dME from [facility]   Prep survey completed?  Yes          Amelia Myles RN

## 2019-10-08 NOTE — OUTREACH NOTE
CHF Week 1 Survey      Responses   Facility patient discharged from?  [place]   Does the patient have one of the following disease processes/diagnoses(primary or secondary)?  CHF   Is there a successful TCM telephone encounter documented?  No   CHF Week 1 attempt successful?  No [currently in ER]   Unsuccessful attempts  Attempt 1          Veena Soto RN

## 2019-10-08 NOTE — PLAN OF CARE
Problem: Cognitive Impairment (Dementia Signs/Symptoms) (Adult)  Goal: Optimized Cognitive Function (Dementia Signs/Symptoms)  Outcome: Ongoing (interventions implemented as appropriate)   10/08/19 1930   Optimized Cognitive Function (Dementia Signs/Symptoms)   Optimized Cognitive Ability Action Step/Short Term Goal (STG) Established 10/08/19   Optimized Cognitive Ability Time Frame for Action Step (STG) 4 days   Optimized Cognitive Ability Action Step (STG) Outcome making progress toward outcome

## 2019-10-08 NOTE — CONSULTS
"[facility] pt with information coming from pt's son,daughter in law and granddaughter. Granddaughter lives with pt and takes care of her. Pt was in [facility] last week for confusion and CHF. Pt was tried on Zyprexa which did not work and then tried on Abilify which helped clear pt some. [facility] doctor d/c Abilify when pt d/c. Family states pt has been talking constantly without making sense. They state pt is seeing things that aren't there and is paranoid. Pt has not slept in two nights and had the same issue before her last hospitalization. Pt is aggitated but less so since being given Geodon in ED. Pt's son did not want his mother to go to nursing rehab after d/c even though pt unable to walk. Pt has been combative with family by kicking and slapping at them. Pt has been taking off her monitors and tried to pull out IV while in the ED.     Family reports that pt has had several episodes of confusion after medical procedures but \"usually clears in a few days. Family states pt had thyroid surgery three yrs ago and was confused and violent for several days afterward but then cleared. Pt had a pacemaker put in on 2019 at [facility] and has been confused since then.     Family stated that pt's daughter  of breast cancer last December and that pt \"took it hard\" as daughter was her main caretaker at that time.   "

## 2019-10-08 NOTE — ED PROVIDER NOTES
EMERGENCY DEPARTMENT ENCOUNTER    CHIEF COMPLAINT  Chief Complaint: Altered Mental Status   History given by:Patient's granddaughter   History limited by:mental status change   Time Seen: 0609  Room Number: 21/21  PMD: Ronda Centeno MD      HPI:  Pt is a 89 y.o. female who presents with altered mental status that has been worsening since discharge from hospital two days ago after admission for CHF. Pt's family affirms generalized weakness and states pt has not been able to ambulate since discharge. Pt's family denies any loss of appetite or fever. Per granddaughter, pt has also been combative at home, but affirms pt has hx of Alzheimer's with behavioral disturbance.     Duration: 2 days   Timing:gradaul   Location:generalized   Radiation:none   Quality:AMS   Intensity/Severity:moderate   Progression:worsened   Associated Symptoms:generalized weakness, difficulty ambulating, combative behavior  Aggravating Factors:none   Alleviating Factors:none   Previous Episodes:Pt has hx of Alzheimer's.   Treatment before arrival:none     PAST MEDICAL HISTORY  Active Ambulatory Problems     Diagnosis Date Noted   • HTN (hypertension) 03/26/2018   • Hyperlipidemia 03/26/2018   • Late onset Alzheimer's disease without behavioral disturbance (CMS/[facility]) 07/30/2019   • Chronic venous insufficiency 07/30/2019   • Type 2 diabetes mellitus without complication, without long-term current use of insulin (CMS/[facility]) 07/30/2019   • Hyperparathyroidism (CMS/[facility]) 07/30/2019   • Acute congestive heart failure (CMS/[facility]) 07/30/2019   • Myocardial infarction (CMS/[facility]) 08/19/2019   • Acute on chronic combined systolic and diastolic CHF (congestive heart failure) (CMS/[facility]) 10/01/2019     Resolved Ambulatory Problems     Diagnosis Date Noted   • Delirium, acute 10/02/2019     Past Medical History:   Diagnosis Date   • Altered mental state    • Chronic venous insufficiency    • Encephalopathy acute    • Hyperlipidemia    • Hypertension    • Late  onset Alzheimer's disease without behavioral disturbance (CMS/HCC)        PAST SURGICAL HISTORY  Past Surgical History:   Procedure Laterality Date   • EYE SURGERY     • HERNIA REPAIR     • HYSTERECTOMY     • REFRACTIVE SURGERY Right    • THYROID SURGERY  2018       FAMILY HISTORY  Family History   Problem Relation Age of Onset   • Diabetes Mother    • Hypertension Mother    • Heart disease Mother    • Cancer Father    • Cancer Daughter        SOCIAL HISTORY  Social History     Socioeconomic History   • Marital status:      Spouse name: Not on file   • Number of children: Not on file   • Years of education: Not on file   • Highest education level: Not on file   Tobacco Use   • Smoking status: Never Smoker   • Smokeless tobacco: Never Used   Substance and Sexual Activity   • Alcohol use: No     Frequency: Never   • Drug use: No   • Sexual activity: Defer         ALLERGIES  Latex    REVIEW OF SYSTEMS  Review of Systems   Unable to perform ROS: Mental status change   Constitutional: Positive for activity change (difficulty ambulating). Negative for appetite change (loss of ) and fever.   Neurological: Positive for weakness (generalized ).   Psychiatric/Behavioral: Positive for behavioral problems (combative pt) and confusion.       PHYSICAL EXAM  ED Triage Vitals   Temp Heart Rate Resp BP SpO2   10/08/19 0535 10/08/19 0535 10/08/19 0535 10/08/19 0535 10/08/19 0535   98.7 °F (37.1 °C) 88 16 140/60 95 %      Temp src Heart Rate Source Patient Position BP Location FiO2 (%)   10/08/19 0535 -- 10/08/19 0545 10/08/19 0545 --   Tympanic  Lying Right arm        Physical Exam   Constitutional: She is well-developed, well-nourished, and in no distress. No distress.   HENT:   Head: Normocephalic and atraumatic.   Mouth/Throat: Oropharynx is clear and moist and mucous membranes are normal.   Eyes: Pupils are equal, round, and reactive to light.   Neck: Normal range of motion.   Cardiovascular: Normal rate, regular rhythm  and normal heart sounds.   Pulmonary/Chest: Effort normal and breath sounds normal. She has no wheezes.   Abdominal: Soft. Bowel sounds are normal. There is no tenderness.   Musculoskeletal: Normal range of motion. She exhibits no edema.   Neurological: She is alert. She is disoriented.   Skin: Skin is warm and dry. No rash noted.   Psychiatric: Mood, memory, affect and judgment normal.   Nursing note and vitals reviewed.      LAB RESULTS  Recent Results (from the past 24 hour(s))   Comprehensive Metabolic Panel    Collection Time: 10/08/19  5:56 AM   Result Value Ref Range    Glucose 159 (H) 65 - 99 mg/dL    BUN 37 (H) 8 - 23 mg/dL    Creatinine 1.11 (H) 0.57 - 1.00 mg/dL    Sodium 138 136 - 145 mmol/L    Potassium 3.9 3.5 - 5.2 mmol/L    Chloride 100 98 - 107 mmol/L    CO2 25.8 22.0 - 29.0 mmol/L    Calcium 8.6 8.6 - 10.5 mg/dL    Total Protein 6.5 6.0 - 8.5 g/dL    Albumin 3.60 3.50 - 5.20 g/dL    ALT (SGPT) 13 1 - 33 U/L    AST (SGOT) 19 1 - 32 U/L    Alkaline Phosphatase 116 39 - 117 U/L    Total Bilirubin 0.8 0.2 - 1.2 mg/dL    eGFR Non African Amer 46 (L) >60 mL/min/1.73    Globulin 2.9 gm/dL    A/G Ratio 1.2 g/dL    BUN/Creatinine Ratio 33.3 (H) 7.0 - 25.0    Anion Gap 12.2 5.0 - 15.0 mmol/L   Protime-INR    Collection Time: 10/08/19  5:56 AM   Result Value Ref Range    Protime 14.2 11.7 - 14.2 Seconds    INR 1.13 (H) 0.90 - 1.10   Lipase    Collection Time: 10/08/19  5:56 AM   Result Value Ref Range    Lipase 20 13 - 60 U/L   Ammonia    Collection Time: 10/08/19  5:56 AM   Result Value Ref Range    Ammonia 48 11 - 51 umol/L   CBC Auto Differential    Collection Time: 10/08/19  5:56 AM   Result Value Ref Range    WBC 7.50 3.40 - 10.80 10*3/mm3    RBC 4.19 3.77 - 5.28 10*6/mm3    Hemoglobin 10.9 (L) 12.0 - 15.9 g/dL    Hematocrit 34.2 34.0 - 46.6 %    MCV 81.6 79.0 - 97.0 fL    MCH 26.0 (L) 26.6 - 33.0 pg    MCHC 31.9 31.5 - 35.7 g/dL    RDW 13.4 12.3 - 15.4 %    RDW-SD 39.5 37.0 - 54.0 fl    MPV 10.5 6.0 -  "12.0 fL    Platelets 181 140 - 450 10*3/mm3    Neutrophil % 80.1 (H) 42.7 - 76.0 %    Lymphocyte % 15.7 (L) 19.6 - 45.3 %    Monocyte % 2.8 (L) 5.0 - 12.0 %    Eosinophil % 0.8 0.3 - 6.2 %    Basophil % 0.1 0.0 - 1.5 %    Immature Grans % 0.5 0.0 - 0.5 %    Neutrophils, Absolute 6.00 1.70 - 7.00 10*3/mm3    Lymphocytes, Absolute 1.18 0.70 - 3.10 10*3/mm3    Monocytes, Absolute 0.21 0.10 - 0.90 10*3/mm3    Eosinophils, Absolute 0.06 0.00 - 0.40 10*3/mm3    Basophils, Absolute 0.01 0.00 - 0.20 10*3/mm3    Immature Grans, Absolute 0.04 0.00 - 0.05 10*3/mm3    nRBC 0.0 0.0 - 0.2 /100 WBC   Urinalysis With Culture If Indicated - Urine, Catheter    Collection Time: 10/08/19  6:27 AM   Result Value Ref Range    Color, UA Yellow Yellow, Straw    Appearance, UA Clear Clear    pH, UA 6.5 5.0 - 8.0    Specific Gravity, UA 1.020 1.005 - 1.030    Glucose, UA Negative Negative    Ketones, UA Negative Negative    Bilirubin, UA Negative Negative    Blood, UA Negative Negative    Protein, UA Negative Negative    Leuk Esterase, UA Negative Negative    Nitrite, UA Negative Negative    Urobilinogen, UA 1.0 E.U./dL 0.2 - 1.0 E.U./dL       I ordered the above labs and reviewed the results    RADIOLOGY  XR Chest 1 View   Final Result          I ordered the above noted radiological studies and reviewed the images on the PACS system.      MEDICAL RECORD REVIEW  Review of labs - 10/4/19 - pt's Creatinine was 1.48.       PROGRESS AND CONSULTS    0610: Upon pt exam, discussed with pt's granddaughter pt's prior visit and note with discharging physician which stated, \"[the pt] is very weak from this episode and her underlying conditions and I did discuss the possibility of and recommendation for rehab based on this.  The patient's son is adamant that he does not want the patient to go to rehab and he would rather the patient return home.  I did discuss with the son and granddaughter and there will be someone with her 24 hours a day and they will " "be able to help her with mobility along with home health.\" Pt's granddaughter states, \"we have reconsidered and we want her in a facility now\". Discussed plan for workup in ED, and if negative, plan for discussion with CCP for further plan for care.     0613: UA and CXR ordered for further evaluation.     0746: Pt rechecked and resting comfortably. Discussed with family negative acute findings in ED and plan for wait on CCP discussion upon their arrival at 0830 for further plan for care.     0802: Reviewed pt's history and workup with Dr. Cuevas.   After a bedside evaluation; Dr Cuevas agrees with the plan of care    0830: Discussed pt's case with Tamera Valdovinos CCP RN, who agreed to discuss case with pt for further plan for care.     1000: Tamera Valdovinos states she is awaiting phone call from [facility] for possible determination of placement.     1235: Discussed pt's case at length with pt's family at bedside. Pt's family requested attempting to admit pt to [facility] and discussed with family at length the plan and attempted placement of patient. Pt's family appeared to display limited understanding of pt's baseline dementia and behavioral disturbances, I discussed this with them at length at bedside. Family is comfortable with plan for awaiting placement as prior discussed.     1540: care turned over to Florina Palomares NP. Pt pending rehab placement    COURSE & MEDICAL DECISION MAKING  Pertinent Labs and Imaging studies that were ordered and reviewed are noted above.  Results were reviewed/discussed with the patient and they were also made aware of online assess.   Pt also made aware that some labs, such as cultures, will not be resulted during ER visit and follow up with PMD is necessary.     MEDICATIONS GIVEN IN ER  Medications   sodium chloride 0.9 % flush 10 mL (not administered)       /94   Pulse 77   Temp 98.1 °F (36.7 °C) (Tympanic)   Resp 16   Ht 152.4 cm (60\")   Wt 64.1 kg (141 lb 4.8 oz)  "  SpO2 100%   BMI 27.60 kg/m²     Discussed all results and noted any abnormalities with patient.  Discussed absoute need to recheck abnormalities with primary care     Reviewed implications of results, diagnosis, meds, responsibility to follow up, warning signs and symptoms of possible worsening, potential complications and reasons to return to ER with patient    Discussed plan for discharge, as there is no emergent indication for admission.  Pt is agreeable and understands need for follow up and repeat testing.  Pt is aware that discharge does not mean that nothing is wrong but it indicates no emergency is present and they must continue care with primary care.  Pt is discharged with instructions to follow up with primary care doctor to have their blood pressure rechecked.       DIAGNOSIS  Final diagnoses:   Alzheimer's dementia without behavioral disturbance, unspecified timing of dementia onset (CMS/[facility])   Generalized weakness       FOLLOW UP   No follow-up provider specified.    RX     Medication List      Changed    ferrous sulfate 325 (65 FE) MG tablet  Commonly known as:  IRON SUPPLEMENT  Take 1 tablet by mouth Daily With Breakfast.  What changed:  additional instructions          I personally reviewed the past medical history, past surgical history, social history, family history, current medications and allergies as they appear in this chart.  The scribe's note accurately reflects the work and decisions made by me.           Documentation assistance provided by audi Gallo for JAYSHREE Hernandez.  Information recorded by the scribe was done at my direction and has been verified and validated by me              Becca Gallo  10/08/19 6692       Sophia Hill APRN  10/08/19 4853

## 2019-10-08 NOTE — DISCHARGE PLACEMENT REQUEST
"Lindsay Martinez (89 y.o. Female)     Date of Birth Social Security Number Address Home Phone MRN    1930  [facility] [address] [address] [phone] [id number]    [employer] Status          [place]        Admission Date Admission Type Admitting Provider Attending Provider Department, Room/Bed    10/8/19 Emergency  Neptali Cuevas MD [facility] Emergency Department,     Discharge Date Discharge Disposition Discharge Destination                       Attending Provider:  Neptali Cuevas MD    Allergies:  Latex    Isolation:  None   Infection:  None   Code Status:  Prior    Ht:  152.4 cm (60\")   Wt:  64.1 kg (141 lb 4.8 oz)    Admission Cmt:  None   Principal Problem:  None                Active Insurance as of 10/8/2019     Primary Coverage     Payor Plan Insurance Group Employer/Plan Group    MEDICARE RAILROAD MEDICARE      Payor Plan Address Payor Plan Phone Number Payor Plan Fax Number Effective Dates    [address] [phone]  1995 - None Entered    [facility] [id number]       Subscriber Name Subscriber Birth Date Member ID       LINDSAY MARTINEZ 1930 [id number]           Secondary Coverage     Payor Plan Insurance Group Employer/Plan Group    ANTHEM BLUE CROSS [facility] SUPP [id number]     Payor Plan Address Payor Plan Phone Number Payor Plan Fax Number Effective Dates    [address]   2016 - None Entered    [facility] [id number]       Subscriber Name Subscriber Birth Date Member ID       LINDSAY MARTINEZ 1930 [id number]                 Emergency Contacts      (Rel.) Home Phone Work Phone Mobile Phone    LEANDRO MORILLO (Daughter) [phone] -- [phone]    ADITHYA MARTINEZ (Son) [phone] -- [phone]              "

## 2019-10-08 NOTE — PROGRESS NOTES
Discharge Planning Assessment  [facility]     Patient Name: Lindsay Martinez  MRN: [id number]  Today's Date: 10/8/2019    Admit Date: 10/8/2019    Discharge Needs Assessment    No documentation.       Discharge Plan    No documentation.       Destination      Service Provider Request Status Selected Services Address Phone Number Fax Number    [facility] Pending - [facility] Sent N/A [address] [facility] [id number] [phone]       Tamera Gerard RN 10/8/2019 1539    Call placed to [facility] for bed referral. Tamera Gerard RN                 [facility] Pending - Request Sent N/A [address] [facility] [address] [phone]       Tamera Gerard RN 10/8/2019 1546    Call placed to [facility]. [facility] - Request Sent N/A [address] [facility] [address] [phone]       Tamera Gerard RN 10/8/2019 1606    Call placed to [facility] regarding bed referral. Tamera Gerard RN                 [facility] Declined N/A [address] [facility] [address] [phone]       Tamera Gerard RN 10/8/2019 0930    Call placed to [facility] for bed availibility per family request. Tamera Gerard RN                 [facility] Declined N/A [address] [facility] [address] [phone]       Tamera Gerard RN 10/8/2019 1232    Call placed to [facility] for bed referral. Tamera Gerard RN                 [facility] Declined N/A [address] [facility] [address] [phone]       Tamera Gerard RN 10/8/2019 1359    Call placed to [facility] for referral per family request. Tamera Gerard RN                   Durable Medical Equipment      No service coordination in this encounter.      Dialysis/Infusion      No service coordination in this encounter.       Home Medical Care      No service coordination in this encounter.      Therapy      No service coordination in this encounter.      Community Resources      No service coordination in this encounter.          Demographic Summary    No documentation.       Functional Status    No documentation.       Psychosocial    No documentation.       Abuse/Neglect    No documentation.       Legal    No documentation.       Substance Abuse    No documentation.       Patient Forms    No documentation.           Tamera Gerard RN

## 2019-10-08 NOTE — PROGRESS NOTES
Discharge Planning Assessment  [facility]     Patient Name: Lindsay Martinez  MRN: [id number]  Today's Date: 10/8/2019    Admit Date: 10/8/2019    Discharge Needs Assessment    No documentation.       Discharge Plan     Row Name 10/08/19 1703       Plan    Plan Comments  No facility has accepted at this time. Patient has been fidgety but cooperative throughout the day. Patient has removed monitor leads and 02 sat monitor.     Row Name 10/08/19 1653       Plan    Plan Comments  [facility] returned call, facililty unable to accept at this time. Referrals placed to several other facilities including [facility], [facility], [facility], [facility]. [facility] w/[place] and [facility] prado/Ting have spoken with family- [facility] has been consulted. Updated provider Tootie BURRIS and new provider Florina PRESSLEY and .         Destination      Service Provider Request Status Selected Services Address Phone Number Fax Number    [facility] Pending - Request Sent N/A [address] [facility] [address] [phone]       Tamera Gerard RN 10/8/2019 1539    Call placed to [facility] for bed referral. Tamera Gerard RN                 [facility] - Request Sent N/A [address] [facility] [address] [phone]       Tamera Gerard RN 10/8/2019 1546    Call placed to [facility] for referral. Tamera Gerard RN                 [facility] Declined N/A [address] [facility] [address] [phone]       Tamera Gerard RN 10/8/2019 0930    Call placed to [facility] for bed availibility per family request. Tamera Gerard RN                 [facility]ined N[facility]A [address] [facility] [address] [phone]       Tamera Gerard RN 10/8/2019 1232    Call placed to Gina prado/Mend[facility] for bed referral. Tamera Gerard  RAJ                 [facility] Declined N/A [address] [facility] [id number] [phone]       Tamera Gerard RN 10/8/2019 1359    Call placed to [facility] for referral per family request. Tamera Gerard RN                 [facility] Declined N/A [address] [facility] [id number] [phone]       Tamera Gerard RN 10/8/2019 1606    Call placed to [facility] regarding bed referral. Tamera Gerard RN                   Durable Medical Equipment      No service coordination in this encounter.      Dialysis/Infusion      No service coordination in this encounter.      Home Medical Care      No service coordination in this encounter.      Therapy      No service coordination in this encounter.      Community Resources      No service coordination in this encounter.          Demographic Summary    No documentation.       Functional Status    No documentation.       Psychosocial    No documentation.       Abuse/Neglect    No documentation.       Legal    No documentation.       Substance Abuse    No documentation.       Patient Forms    No documentation.           Tamera Gerard RN

## 2019-10-08 NOTE — ED TRIAGE NOTES
Pt family report to EMS that pt is more altered than usual. Became combative with family tonight. Seen here for 4 days ago and got a paracentesis.

## 2019-10-08 NOTE — PROGRESS NOTES
Discharge Planning Assessment  [facility]     Patient Name: Lindsay Martinez  MRN: [id number]  Today's Date: 10/8/2019    Admit Date: 10/8/2019    Discharge Needs Assessment     Row Name 10/08/19 1133       Discharge Needs Assessment    Offered/Gave Vendor List  yes    Patient's Choice of Community Agency(s)  [facility]        Discharge Plan     Row Name 10/08/19 1126       Plan    Plan Comments  late entry- 0930- Entered room, introduced self and explained role; verified information on facesheet. Son (POPETROS) Kervin and granddaughter Jennifer at bedside. Patient just d/c from [facility] on 10/[facility]- home with granddaughter- family reports patient has become weaker since discharge and had increased confusion. Family originally declined rehab for patient but now feels she could benefit from it. Provided RTR, [facility] and sitter list. Family requests referral to [facility]. Call placed to [facility] w/[facility]. .me        Destination      Service Provider Request Status Selected Services Address Phone Number Fax Number    [facility] Pending - Request Sent N/A [address] [facility] [address] [phone]       Tamera Gerard RN 10/8/2019 0930    Call placed to [facility] for bed availibility per family request. Tamera Gerard RN                 [facility] Pending - Request Sent N/A [address] [facility] [address] [phone]       Tamera Gerard RN 10/8/2019 1232    Call placed to [facility] w/[facility] for bed referral. Tamera Gerard RN                   Durable Medical Equipment      No service coordination in this encounter.      Dialysis/Infusion      No service coordination in this encounter.      Home Medical Care      No service coordination in this encounter.      Therapy      No service coordination in this encounter.      Community Resources      No service coordination in this encounter.          Demographic Summary    No documentation.        Functional Status    No documentation.       Psychosocial    No documentation.       Abuse/Neglect    No documentation.       Legal    No documentation.       Substance Abuse    No documentation.       Patient Forms    No documentation.           Tamera Gerard RN

## 2019-10-08 NOTE — ED PROVIDER NOTES
EMERGENCY DEPARTMENT ENCOUNTER    Room number:  21/21  Date Seen:  10/8/2019  Time of transfer:1500  PCP:  Ronda Centeno MD    Laboratory Results:  Recent Results (from the past 24 hour(s))   Comprehensive Metabolic Panel    Collection Time: 10/08/19  5:56 AM   Result Value Ref Range    Glucose 159 (H) 65 - 99 mg/dL    BUN 37 (H) 8 - 23 mg/dL    Creatinine 1.11 (H) 0.57 - 1.00 mg/dL    Sodium 138 136 - 145 mmol/L    Potassium 3.9 3.5 - 5.2 mmol/L    Chloride 100 98 - 107 mmol/L    CO2 25.8 22.0 - 29.0 mmol/L    Calcium 8.6 8.6 - 10.5 mg/dL    Total Protein 6.5 6.0 - 8.5 g/dL    Albumin 3.60 3.50 - 5.20 g/dL    ALT (SGPT) 13 1 - 33 U/L    AST (SGOT) 19 1 - 32 U/L    Alkaline Phosphatase 116 39 - 117 U/L    Total Bilirubin 0.8 0.2 - 1.2 mg/dL    eGFR Non African Amer 46 (L) >60 mL/min/1.73    Globulin 2.9 gm/dL    A/G Ratio 1.2 g/dL    BUN/Creatinine Ratio 33.3 (H) 7.0 - 25.0    Anion Gap 12.2 5.0 - 15.0 mmol/L   Protime-INR    Collection Time: 10/08/19  5:56 AM   Result Value Ref Range    Protime 14.2 11.7 - 14.2 Seconds    INR 1.13 (H) 0.90 - 1.10   Lipase    Collection Time: 10/08/19  5:56 AM   Result Value Ref Range    Lipase 20 13 - 60 U/L   Ammonia    Collection Time: 10/08/19  5:56 AM   Result Value Ref Range    Ammonia 48 11 - 51 umol/L   CBC Auto Differential    Collection Time: 10/08/19  5:56 AM   Result Value Ref Range    WBC 7.50 3.40 - 10.80 10*3/mm3    RBC 4.19 3.77 - 5.28 10*6/mm3    Hemoglobin 10.9 (L) 12.0 - 15.9 g/dL    Hematocrit 34.2 34.0 - 46.6 %    MCV 81.6 79.0 - 97.0 fL    MCH 26.0 (L) 26.6 - 33.0 pg    MCHC 31.9 31.5 - 35.7 g/dL    RDW 13.4 12.3 - 15.4 %    RDW-SD 39.5 37.0 - 54.0 fl    MPV 10.5 6.0 - 12.0 fL    Platelets 181 140 - 450 10*3/mm3    Neutrophil % 80.1 (H) 42.7 - 76.0 %    Lymphocyte % 15.7 (L) 19.6 - 45.3 %    Monocyte % 2.8 (L) 5.0 - 12.0 %    Eosinophil % 0.8 0.3 - 6.2 %    Basophil % 0.1 0.0 - 1.5 %    Immature Grans % 0.5 0.0 - 0.5 %    Neutrophils, Absolute 6.00 1.70 -  7.00 10*3/mm3    Lymphocytes, Absolute 1.18 0.70 - 3.10 10*3/mm3    Monocytes, Absolute 0.21 0.10 - 0.90 10*3/mm3    Eosinophils, Absolute 0.06 0.00 - 0.40 10*3/mm3    Basophils, Absolute 0.01 0.00 - 0.20 10*3/mm3    Immature Grans, Absolute 0.04 0.00 - 0.05 10*3/mm3    nRBC 0.0 0.0 - 0.2 /100 WBC   Urinalysis With Culture If Indicated - Urine, Catheter    Collection Time: 10/08/19  6:27 AM   Result Value Ref Range    Color, UA Yellow Yellow, Straw    Appearance, UA Clear Clear    pH, UA 6.5 5.0 - 8.0    Specific Gravity, UA 1.020 1.005 - 1.030    Glucose, UA Negative Negative    Ketones, UA Negative Negative    Bilirubin, UA Negative Negative    Blood, UA Negative Negative    Protein, UA Negative Negative    Leuk Esterase, UA Negative Negative    Nitrite, UA Negative Negative    Urobilinogen, UA 1.0 E.U./dL 0.2 - 1.0 E.U./dL     I reviewed the above results.    Radiology:  XR Chest 1 View   Final Result   XR CHEST 1 VW-     Clinical: Altered mental status, hypertension     COMPARISON 10/5/2019     FINDINGS: Transvenous pacemaker again noted in position. Cardiac size  stable, upper limits of normal. No pleural effusion or pulmonary edema  has developed. There is a subtle area of airspace disease in the right  infrahilar region, infiltrate versus residual right lower lobe  atelectasis. This area appears quite similar to the previous 10/5/2019  examination. The left lung is clear. The remainder of examination is  unremarkable.     This report was finalized on 10/8/2019 7:01 AM by Dr. Lex Laird M.D.            I reviewed the above results    Medications ordered in ED:  Medications   sodium chloride 0.9 % flush 10 mL (not administered)   ziprasidone (GEODON) injection 10 mg (10 mg Intramuscular Given 10/8/19 1637)       Progress and Consult Notes:  1500[facility]  [facility]  [facility] RN is working on this.    1630:  Per Nurse and family the patient is extremely agitated,  "they have had to move her up in bed repeatedly and she has not slept in 2 days.  She is talking about things that aren't there and is paranoid, she is saying, \"take that knife out of me\".  She denies wanting to eat but says she will drink and take her meds.  They have agreed to me giving her a Geodon shot.  I will call [facility] for evaluation.  CCP is still trying for placement. I have discussed this with Dr. Cuevas.     1741: Pt has been given Geodon, but remains agitated with [facility] at bedside.    1816: Pt has been evaluated by [facility]. She will be admitted to [facility].     Diagnosis:  Final diagnoses:   Late onset Alzheimer's disease with behavioral disturbance (CMS/HCC)       Follow Up:  No follow-up provider specified.    RX:     Medication List      Changed    ferrous sulfate 325 (65 FE) MG tablet  Commonly known as:  IRON SUPPLEMENT  Take 1 tablet by mouth Daily With Breakfast.  What changed:  additional instructions          Provider attestation:  I personally reviewed the past medical history, past surgical history, social history, family history, current medications, and allergies as they appear in the chart.    The patient was seen and examined by myself and Dr. Cuevas, who agree with plan.     Documentation assistance provided by audi Salamanca for JAYSHREE Xie.  Information recorded by the audi was done at my direction and has been verified and validated by me.           Porfirio Salamanca  10/08/19 1742       Porfirio Salamanca  10/08/19 1817       Florina Palomares APRN  10/08/19 2001    "

## 2019-10-08 NOTE — PROGRESS NOTES
Discharge Planning Assessment  [facility]     Patient Name: Lindsay Martinez  MRN: [id number]  Today's Date: 10/8/2019    Admit Date: 10/8/2019    Discharge Needs Assessment    No documentation.       Discharge Plan    No documentation.       Destination      Service Provider Request Status Selected Services Address Phone Number Fax Number    [facility] P[facility] - [facility] N[facility]A [address] [facility] [facility] [id number] [phone]       Tamera Gerard RN 10/8/2019 1539    Call placed to [place] for bed referral. Tamera Gerard RN                 [facility] Declined N/A [address] [facility] [address] [phone]       Tamera Gerard RN 10/8/2019 0930    Call placed to [facility] for bed availibility per family request. Tamera Gerard RN                 [facility] Declined N/A [address] [facility] [address] [phone]       Tamera Gerard RN 10/8/2019 1232    Call placed to [facility] for bed referral. Tamera Gerard RN                 [facility] Declined N/A [address] [address] [phone]       Tamera Gerard RN 10/8/2019 1359    Call placed to [facility] for referral per family request. Tamera Gerard RN                   Durable Medical Equipment      No service coordination in this encounter.      Dialysis/Infusion      No service coordination in this encounter.      Home Medical Care      No service coordination in this encounter.      Therapy      No service coordination in this encounter.      Community Resources      No service coordination in this encounter.          Demographic Summary    No documentation.       Functional Status    No documentation.       Psychosocial    No documentation.       Abuse/Neglect    No documentation.       Legal    No documentation.       Substance Abuse    No documentation.       Patient Forms     No documentation.           Tamera Gerard RN

## 2019-10-08 NOTE — DISCHARGE PLACEMENT REQUEST
"Lindsay Martinez (89 y.o. Female)     Date of Birth Social Security Number Address Home Phone MRN    1930  [facility] [address] [address] [phone] [id number]    Amish Marital Status          Religious        Admission Date Admission Type Admitting Provider Attending Provider Department, Room/Bed    10/8/19 Emergency  Neptali Cuevas MD [facility] Emergency Department,     Discharge Date Discharge Disposition Discharge Destination                       Attending Provider:  Neptali Cuevas MD    Allergies:  Latex    Isolation:  None   Infection:  None   Code Status:  Prior    Ht:  152.4 cm (60\")   Wt:  64.1 kg (141 lb 4.8 oz)    Admission Cmt:  None   Principal Problem:  None                Active Insurance as of 10/8/2019     Primary Coverage     Payor Plan Insurance Group Employer/Plan Group    MEDICARE RAILROAD MEDICARE      Payor Plan Address Payor Plan Phone Number Payor Plan Fax Number Effective Dates    [address] [phone]  1995 - None Entered    [facility] [address]       Subscriber Name Subscriber Birth Date Member ID       LINDSAY MARTINEZ 1930 [id number]           Secondary Coverage     Payor Plan Insurance Group Employer/Plan Group    ANTHEM BLUE CROSS [facility] SUPP [id number]     Payor Plan Address Payor Plan Phone Number Payor Plan Fax Number Effective Dates    [address]   2016 - None Entered    [facility] [id number]       Subscriber Name Subscriber Birth Date Member ID       LINDSAY MARTINEZ 1930 [id number]                 Emergency Contacts      (Rel.) Home Phone Work Phone Mobile Phone    LEANDRO MORILLO (Daughter) [phone] -- [phone]    ADITHYA MARTINEZ (Son) [phone] -- [phone]              "

## 2019-10-08 NOTE — ED PROVIDER NOTES
MD ATTESTATION NOTE    The BLAKE and I have discussed this patient's history, physical exam, and treatment plan.  I have reviewed the documentation and personally had a face to face interaction with the patient. I affirm the documentation and agree with the treatment and plan.  The attached note describes my personal findings.      History  89-year-old female with history of dementia brought by family as they feel they cannot take care of her at home.  They had refused nursing home placement after prior admission, but now her having second thoughts.    Physical Exam  Vital Signs reviewed  Lying in bed in no apparent distress  Patient with chronic confusion but able to follow simple commands and answer simple questions.    Disposition  Medical work-up is fairly unremarkable.  See no indication for admission at this point.  Will have CCP evaluate patient for possible nursing home placement.     Neptali Cuevas MD  10/08/19 0864

## 2019-10-08 NOTE — PROGRESS NOTES
Discharge Planning Assessment  [facility]     Patient Name: Lindsay Martinez  MRN: [id number]  Today's Date: 10/8/2019    Admit Date: 10/8/2019    Discharge Needs Assessment     Row Name 10/08/19 1133       Discharge Needs Assessment    Offered/Gave Vendor List  yes    Patient's Choice of Community Agency(s)  [facility]        Discharge Plan     Row Name 10/08/19 1126       Plan    Plan Comments  late entry- 0930- Entered room, introduced self and explained role; verified information on facesheet. Son (POPETROS) Kervin and granddaughter Jennifer at bedside. Patient just d/c from [facility] on 10/6- home with granddaughter- family reports patient has become weaker since discharge and had increased confusion. Family originally declined rehab for patient but now feels she could benefit from it. Provided RTR, [facility] Transitions and sitter list. Family requests referral to [facility]. Call placed to [facility] w/[facility] .[facility]        Destination      Service Provider Request Status Selected Services Address Phone Number Fax Number    [facility] Pending - Request Sent N/A [address] [facility] [address] [phone]       Tamera Gerard RN 10/8/2019 0930    Call placed to [facility] for bed availibility per family request. Tamera Gerard RN                   Durable Medical Equipment      No service coordination in this encounter.      Dialysis/Infusion      No service coordination in this encounter.      Home Medical Care      No service coordination in this encounter.      Therapy      No service coordination in this encounter.      Community Resources      No service coordination in this encounter.          Demographic Summary    No documentation.       Functional Status    No documentation.       Psychosocial    No documentation.       Abuse/Neglect    No documentation.       Legal    No documentation.       Substance Abuse    No documentation.       Patient Forms    No documentation.            Tamera Gerard RN

## 2019-10-08 NOTE — TELEPHONE ENCOUNTER
Halle DELA CRUZ from [facility] called requesting orders for nursing, pt and ot.  A verbal was given.  Her number is [phone] if you need to call for any reason.

## 2019-10-09 PROBLEM — D64.9 ANEMIA: Status: ACTIVE | Noted: 2019-01-01

## 2019-10-09 PROBLEM — Z95.0 PRESENCE OF CARDIAC PACEMAKER: Status: ACTIVE | Noted: 2019-01-01

## 2019-10-09 PROBLEM — I25.10 CAD (CORONARY ARTERY DISEASE): Status: ACTIVE | Noted: 2019-01-01

## 2019-10-09 PROBLEM — I50.9 CHRONIC CHF (HCC): Status: ACTIVE | Noted: 2019-01-01

## 2019-10-09 NOTE — PROGRESS NOTES
Discharge Planning Assessment  [facility]     Patient Name: Lindsay Martinez  MRN: [id number]  Today's Date: 10/9/2019    Admit Date: 10/8/2019    Discharge Needs Assessment     Row Name 10/09/19 1159       Living Environment    Lives With  grandchild(amanda)    Current Living Arrangements  home/apartment/condo    Primary Care Provided by  child(amanda)    Provides Primary Care For  no one    Family Caregiver if Needed  child(amanda), adult;grandchild(amanda), adult    Quality of Family Relationships  supportive;involved;helpful        Discharge Plan     Row Name 10/09/19 1207       Plan    Plan  Assessing DCP    Plan Comments  Attempted to screen pt however pt was sleeping.  Call placed to pt's son Mina Martinez.  Per Kervin pt was lving at home and a grand daughter was living with her..   Pt's son stated that  pt was ambulatory  2 weeks ago but has not been up walking since then.   Pt's son sis state that upon D/C he would like pt evaluated  for [facility] if needed.  Per son roddy fleming will be for pt to go to [facility] if needed then back to home.  Per pt's son pt was current with [facility] which was set up after hospital DC last week.   will follow pt's progress to work on DCP/needs.         Destination      No service coordination in this encounter.      Durable Medical Equipment      No service coordination in this encounter.      Dialysis/Infusion      No service coordination in this encounter.      Home Medical Care      No service coordination in this encounter.      Therapy      No service coordination in this encounter.      Community Resources      No service coordination in this encounter.          Demographic Summary     Row Name 10/09/19 1157       General Information    Admission Type  inpatient    Arrived From  emergency department    Referral Source  admission list    Reason for Consult  discharge planning    Preferred Language  English     Used During This Interaction  no        Functional Status      Row Name 10/09/19 1158       Functional Status    Usual Activity Tolerance  good    Current Activity Tolerance  moderate       Functional Status, IADL    Medications  assistive person    Meal Preparation  assistive person    Housekeeping  assistive person    Laundry  assistive person    Shopping  assistive person        Psychosocial    No documentation.       Abuse/Neglect    No documentation.       Legal    No documentation.       Substance Abuse    No documentation.       Patient Forms    No documentation.           NESSA Whitman

## 2019-10-09 NOTE — H&P
IDENTIFYING INFORMATION: The patient is an 89-year-old white female admitted with mental status changes.  Field none next field    CHIEF COMPLAINT: None given    INFORMANT: Chart, patient cannot cooperate with interview    RELIABILITY: Limited    HISTORY OF PRESENT ILLNESS: The patient is a 89-year-old white female admitted after having been brought to this facility by family members yesterday.  Patient been admitted to this facility last week for confusion and congestive heart failure.  The patient was begun on Abilify and seemed to do well with that medication after not responding to Zyprexa.  The patient was reporting visual hallucinations and paranoia.  She had not slept well during her last hospitalization.  Family reports that she has a history of becoming confused after medical stays but tends to clear shortly thereafter.  When seen today the patient is a bed.  She is quite groggy and cannot cooperate to any significant degree with interview.    PAST PSYCHIATRIC HISTORY: As above.  The patient has been prescribed Aricept and mirtazapine in the past.    PAST MEDICAL HISTORY: Significant for history of thyroid surgery, congestive heart failure    MEDICATIONS:   Current Facility-Administered Medications   Medication Dose Route Frequency Provider Last Rate Last Dose   • acetaminophen (TYLENOL) tablet 650 mg  650 mg Oral Q4H PRN Henry Stapleton III, MD       • aluminum-magnesium hydroxide-simethicone (MAALOX MAX) 400-400-40 MG/5ML suspension 15 mL  15 mL Oral Q6H PRN Henry Stapleton III, MD       • atorvastatin (LIPITOR) tablet 10 mg  10 mg Oral Daily Henry Stapleton III, MD       • loperamide (IMODIUM) capsule 2 mg  2 mg Oral Q2H PRN Henry Stapleton III, MD       • magnesium hydroxide (MILK OF MAGNESIA) suspension 2400 mg/10mL 10 mL  10 mL Oral Daily PRN Henry Stapleton III, MD       • magnesium oxide (MAG-OX) tablet 400 mg  400 mg Oral Daily Daya  Henry Felipe III, MD       • melatonin tablet 5 mg  5 mg Oral Nightly Henry Stapleton III, MD   5 mg at 10/08/19 2057   • mirtazapine (REMERON) tablet 7.5 mg  7.5 mg Oral Nightly Henry Stapleton III, MD   7.5 mg at 10/08/19 2058   • OLANZapine (zyPREXA) injection 10 mg  10 mg Intramuscular Q8H PRN Henry Stapleton III, MD             ALLERGIES: Latex    FAMILY HISTORY: Noncontributory    SOCIAL HISTORY: No reported use of alcohol tobacco or street drugs    MENTAL STATUS EXAM: Patient is a thin white female appearing her stated age.  She has no apparent physical distress at the time of examination.  She is extremely groggy and her speech is slurred and nonsensical.    ASSETS/LIABILITIES: To be assessed    DIAGNOSTIC IMPRESSION: Delirium, etiology unknown, history of congestive heart failure dyslipidemia    PLAN: Given the patient's history of response to Abilify we will start this medication at a low dose of 2 mg daily.  As the patient stay in the hospital progresses we may consider initiation of neuropsychiatric evaluation.  I will go ahead and order PT and OT for the patient.  Estimated length of stay in the hospital 7 to 10 days.

## 2019-10-09 NOTE — PLAN OF CARE
Problem: Skin Injury Risk (Adult)  Goal: Identify Related Risk Factors and Signs and Symptoms  Outcome: Outcome(s) achieved Date Met: 10/09/19   10/09/19 0608   Skin Injury Risk (Adult)   Related Risk Factors (Skin Injury Risk) advanced age;cognitive impairment;mobility impaired       Problem: Fall Risk (Adult)  Goal: Identify Related Risk Factors and Signs and Symptoms  Outcome: Outcome(s) achieved Date Met: 10/09/19   10/09/19 0608   Fall Risk (Adult)   Related Risk Factors (Fall Risk) age-related changes;confusion/agitation;gait/mobility problems   Signs and Symptoms (Fall Risk) presence of risk factors

## 2019-10-09 NOTE — CONSULTS
Consultation     Patient Name: Lindsay Martinez  Age/Sex: 89 y.o. female  : 1930  MRN: [id number]    Date of Admission: 10/8/2019  Date of Encounter Visit: 10/09/19  Encounter Provider: JAYSHREE Jacobsen  Place of Service: [facility]  Patient Care Team:  Ronda Centeno MD as PCP - General (Family Medicine)    Subjective:     Consults  Reason for consultation: Medical evaluation contributing to current psychiatric illness.    Chief Complaint:   Confusion and combative    History of Present Illness  Lindsay Martinez is a 89 y.o. female who was admitted to the Crisis Management Unit for further evaluation regarding dementia with increased confusion and combativeness.  We were asked to see and evaluate her medical issues as they may pertain to her current psychiatric illness. Patient has a history of CHF, CAD with non-STEMI in , chronic venous insufficiency, HTN, HLD and late onset Alzheimer's dementia.  Patient was recently admitted to [facility] in late September for what was thought to be cardiac arrest and ended up having a pacemaker implanted at that time.  She presented to [facility] on 10/1/2019 due to increased confusion, hallucinations, cough and shortness of breath.  She was noted to have CHF and treated with diuretics.  She was noted to have delirium with ing and was treated with Zyprexa with no significant benefit.  She was eventually started on Abilify and responded well, but was not resumed at discharge on 10/6/19.     Family brought the patient back to the emergency room yesterday due to increased confusion and combativeness.  Per family patient has been having significant decline in memory especially since December after the death of her daughter who was her primary caregiver.  She was admitted to [facility] for further medication management.    Denies any recent fever, chills, chest pain, palpitations, dizziness, nausea, vomiting, diarrhea, dysuria or edema.  She has been  having some difficulty sleeping lately.  Labs in ER were unremarkable    Review of Systems   Constitutional: Negative for chills, fatigue and fever.   HENT: Negative for congestion and trouble swallowing.    Eyes: Negative for visual disturbance.   Respiratory: Negative for cough, shortness of breath and wheezing.    Cardiovascular: Negative for chest pain, palpitations and leg swelling.   Gastrointestinal: Negative for abdominal pain, diarrhea and vomiting.   Genitourinary: Negative for difficulty urinating and dysuria.   Musculoskeletal: Negative for back pain.   Neurological: Positive for weakness. Negative for dizziness and syncope.   Psychiatric/Behavioral: Positive for sleep disturbance. The patient is not nervous/anxious.    All other systems reviewed and are negative.  difficult to determine accuracy given dementia and forgetfulness.     Past Medical and Surgical History:  Past Medical History:   Diagnosis Date   • Altered mental state    • Chronic venous insufficiency    • Encephalopathy acute    • Hyperlipidemia    • Hypertension    • Late onset Alzheimer's disease without behavioral disturbance (CMS/HCC)        Past Surgical History:   Procedure Laterality Date   • EYE SURGERY     • HERNIA REPAIR     • HYSTERECTOMY     • REFRACTIVE SURGERY Right    • THYROID SURGERY  2018       Home Medications:   Medications Prior to Admission   Medication Sig Dispense Refill Last Dose   • aspirin 81 MG EC tablet Take 81 mg by mouth Daily.   10/7/2019 at Unknown time   • donepezil (ARICEPT) 5 MG tablet Take 5 mg by mouth Daily.  0 10/7/2019 at Unknown time   • ferrous sulfate (IRON SUPPLEMENT) 325 (65 FE) MG tablet Take 1 tablet by mouth Daily With Breakfast. (Patient taking differently: Take 325 mg by mouth Daily With Breakfast. Filled for 30 days on 8/19. No refills given until patient is seen by her physician.) 30 tablet 0 Unknown at Unknown time   • furosemide (LASIX) 40 MG tablet Take 1 tablet by mouth Daily. 30  tablet 0 10/7/2019 at Unknown time   • magnesium oxide (MAGOX) 400 (241.3 Mg) MG tablet tablet Take 400 mg by mouth 2 (Two) Times a Day.  0 10/7/2019 at Unknown time   • melatonin 5 MG tablet tablet Take 10 mg by mouth At Night As Needed.   10/7/2019 at Unknown time   • metoprolol succinate XL (TOPROL-XL) 25 MG 24 hr tablet Take 12.5 mg by mouth Daily.   10/7/2019 at Unknown time   • mirtazapine (REMERON) 7.5 MG tablet Take 7.5 mg by mouth Every Night.  0 10/7/2019 at Unknown time   • potassium chloride (K-DUR,KLOR-CON) 20 MEQ CR tablet Take 20 mEq by mouth Daily.  0 10/7/2019 at Unknown time   • simvastatin (ZOCOR) 20 MG tablet Take 20 mg by mouth Every Night.   10/7/2019 at Unknown time       Inpatient Medications:  Scheduled Meds:  ARIPiprazole 2 mg Oral Daily   aspirin 81 mg Oral Daily   atorvastatin 10 mg Oral Daily   donepezil 5 mg Oral Nightly   furosemide 40 mg Oral Daily   magnesium oxide 400 mg Oral BID   melatonin 5 mg Oral Nightly   metoprolol succinate XL 12.5 mg Oral Daily   mirtazapine 7.5 mg Oral Nightly     Continuous Infusions:   PRN Meds:.•  acetaminophen  •  aluminum-magnesium hydroxide-simethicone  •  loperamide  •  magnesium hydroxide  •  OLANZapine    Allergies:  Allergies   Allergen Reactions   • Latex Itching       Past Social History:  Social History     Socioeconomic History   • Marital status:      Spouse name: Not on file   • Number of children: Not on file   • Years of education: Not on file   • Highest education level: Not on file   Tobacco Use   • Smoking status: Never Smoker   • Smokeless tobacco: Never Used   Substance and Sexual Activity   • Alcohol use: No     Frequency: Never   • Drug use: No   • Sexual activity: Defer       Past Family History:  Family History   Problem Relation Age of Onset   • Diabetes Mother    • Hypertension Mother    • Heart disease Mother    • Cancer Father    • Cancer Daughter        Objective:   Temp:  [98.2 °F (36.8 °C)] 98.2 °F (36.8 °C)  Heart  Rate:  [48-87] 72  Resp:  [15-16] 16  BP: (138-176)/(57-74) 138/71   SpO2:  [92 %-97 %] 95 %  on    Device (Oxygen Therapy): room air    Intake/Output Summary (Last 24 hours) at 10/9/2019 1432  Last data filed at 10/9/2019 1233  Gross per 24 hour   Intake 240 ml   Output --   Net 240 ml     Body mass index is 27.6 kg/m².      10/08/19  2022   Weight: 64.1 kg (141 lb 5 oz)     Weight change:   Ventilator/Non-Invasive Ventilation Settings (From admission, onward)    None          Physical Exam   Constitutional: She appears well-developed and well-nourished. No distress.   HENT:   Head: Normocephalic and atraumatic.   Eyes: Conjunctivae are normal. Pupils are equal, round, and reactive to light. No scleral icterus.   Neck: Neck supple. No tracheal deviation present.   Cardiovascular: Normal rate and regular rhythm.   Murmur (2/6) heard.  Left chest pacemaker noted with steri strips and dried blood along incisional site noted.    Pulmonary/Chest: Effort normal and breath sounds normal. She has no wheezes. She has no rales.   Abdominal: Soft. Bowel sounds are normal. She exhibits no distension. There is no tenderness.   Musculoskeletal: She exhibits no edema.   Neurological: She is alert.   Oriented to self. Confused to time and place and unable to identify family   Skin: Skin is warm and dry. She is not diaphoretic.   Psychiatric:   Flat affect   Nursing note and vitals reviewed.       Lab Review:     Results from last 7 days   Lab Units 10/08/19  0556 10/05/19  0426 10/04/19  0509   SODIUM mmol/L 138 145 141   POTASSIUM mmol/L 3.9 3.9 4.1   CHLORIDE mmol/L 100 105 101   CO2 mmol/L 25.8 26.4 28.6   BUN mg/dL 37* 34* 29*   CREATININE mg/dL 1.11* 1.30* 1.38*   EGFR IF NONAFRICN AM mL/min/1.73 46* 39* 36*   GLUCOSE mg/dL 159* 83 136*   CALCIUM mg/dL 8.6 8.5* 9.0   AST (SGOT) U/L 19  --   --    ALT (SGPT) U/L 13  --   --      Estimated Creatinine Clearance: 28.7 mL/min (A) (by C-G formula based on SCr of 1.11 mg/dL (H)).           Results from last 7 days   Lab Units 10/08/19  0556 10/05/19  0426 10/04/19  0509   WBC 10*3/mm3 7.50 4.90 5.05   HEMOGLOBIN g/dL 10.9* 11.2* 11.5*   HEMATOCRIT % 34.2 34.9 36.8   PLATELETS 10*3/mm3 181 186 211   MCV fL 81.6 81.9 82.5   MCH pg 26.0* 26.3* 25.8*   MCHC g/dL 31.9 32.1 31.3*   RDW % 13.4 13.3 13.5   RDW-SD fl 39.5 39.1 40.3   MPV fL 10.5 10.1 9.7   NEUTROPHIL % % 80.1* 65.9 65.5   LYMPHOCYTE % % 15.7* 23.5 24.8   MONOCYTES % % 2.8* 8.4 7.9   EOSINOPHIL % % 0.8 1.4 1.2   BASOPHIL % % 0.1 0.6 0.4   IMM GRAN % % 0.5 0.2 0.2   NEUTROS ABS 10*3/mm3 6.00 3.23 3.31   LYMPHS ABS 10*3/mm3 1.18 1.15 1.25   MONOS ABS 10*3/mm3 0.21 0.41 0.40   EOS ABS 10*3/mm3 0.06 0.07 0.06   BASOS ABS 10*3/mm3 0.01 0.03 0.02   IMMATURE GRANS (ABS) 10*3/mm3 0.04 0.01 0.01   NRBC /100 WBC 0.0 0.2 0.0     Results from last 7 days   Lab Units 10/08/19  0556 10/04/19  0509   INR  1.13* 1.06   APTT seconds  --  29.3               Invalid input(s): LDLCALC      Results from last 7 days   Lab Units 10/08/19  0556   TSH uIU/mL 1.200                 Results from last 7 days   Lab Units 10/08/19  0556   LIPASE U/L 20   AMMONIA umol/L 48     Results from last 7 days   Lab Units 10/04/19  1230   BODYFLDCX  No growth at 5 days     Results from last 7 days   Lab Units 10/08/19  0627   NITRITE UA  Negative               Imaging:  Imaging Results (last 24 hours)     ** No results found for the last 24 hours. **          EKG:  No orders to display       I reviewed the patient's new clinical results, lab tests and medications.     Problem List:     Active Hospital Problems    Diagnosis  POA   • **Dementia with behavioral disturbance (CMS/[facility]) [F03.91]  Yes   • Chronic CHF (CMS/[facility]) [I50.9]  Unknown   • CAD (coronary artery disease) [I25.10]  Unknown   • Presence of cardiac pacemaker [Z95.0]  Unknown   • Anemia [D64.9]  Unknown   • Type 2 diabetes mellitus without complication, without long-term current use of insulin (CMS/[facility]) [E11.9]  Yes    • HTN (hypertension) [I10]  Yes      Resolved Hospital Problems   No resolved problems to display.       Assessment and Plan:     ·   Dementia with behavioral disturbance (CMS/HCC)- progressive decline. Medication adjustments per psychiatry team.  ·   HTN (hypertension)- stable. Continue home toprol with parameters.  · Presence of pacemaker- ok to remove steri strips from site. HR recorded at 48 and I question the validity of this given presence of PPM. If further issues would consider cardiology consult with PPM interrogation.   ·   Type 2 diabetes mellitus without complication, without long-term current use of insulin (CMS/HCC). Recent HgbA1c was 5.4 and not on meds at home. Glucose 150's currently. Continue NCS diet otherwise no need to monitor glucose.   ·   Chronic CHF (CMS/HCC)- no sign of acute exacerbation. Continue home lasix and potassium.   ·   CAD (coronary artery disease)- no recent chest pain. Continue aspirin, statin and toprol.  · Anemia- Hgb near baseline of 11. Continue home iron supplement.     The patient seems medically stable at this time.  There are no medical issues which need to be addressed while she is under psychiatric care and may continue to follow up with her PCP as an outpatient. We will sign off for now. Please call if any further questions or concerns.       JAYSHREE Jacobsen  [facility]  10/09/19  2:32 PM    Dictated utilizing Dragon dictation

## 2019-10-09 NOTE — PLAN OF CARE
Problem: Patient Care Overview  Goal: Plan of Care Review  Outcome: Ongoing (interventions implemented as appropriate)   10/09/19 0300 10/09/19 0535   Plan of Care Review   Progress --  no change   OTHER   Outcome Summary --  Pt arrived to unit via stretcher in urine-soaked sheets. Disoriented x 4. Pt laid in bed for hours picking at the air and talking to self. Cooperative and med compliant. Will continue to monitor and assess.    Coping/Psychosocial   Plan of Care Reviewed With patient --    Coping/Psychosocial   Patient Agreement with Plan of Care unable to participate --         Problem: Overarching Goals (Adult)  Goal: Adheres to Safety Considerations for Self and Others  Outcome: Ongoing (interventions implemented as appropriate)    Goal: Optimized Coping Skills in Response to Life Stressors  Outcome: Ongoing (interventions implemented as appropriate)    Goal: Develops/Participates in Therapeutic [place] to Support Successful Transition  Outcome: Ongoing (interventions implemented as appropriate)      Problem: Cognitive Impairment (Dementia Signs/Symptoms) (Adult)  Goal: Optimized Cognitive Function (Dementia Signs/Symptoms)  Outcome: Ongoing (interventions implemented as appropriate)      Problem: Skin Injury Risk (Adult)  Goal: Identify Related Risk Factors and Signs and Symptoms  Outcome: Ongoing (interventions implemented as appropriate)    Goal: Skin Health and Integrity  Outcome: Ongoing (interventions implemented as appropriate)      Problem: Fall Risk (Adult)  Goal: Identify Related Risk Factors and Signs and Symptoms  Outcome: Ongoing (interventions implemented as appropriate)    Goal: Absence of Fall  Outcome: Ongoing (interventions implemented as appropriate)

## 2019-10-09 NOTE — PLAN OF CARE
Problem: Patient Care Overview  Goal: Discharge Needs Assessment  Outcome: Ongoing (interventions implemented as appropriate)   10/09/19 1218   Discharge Needs Assessment   Discharge Coordination/Progress follow with treatment team to work toward a DCP

## 2019-10-09 NOTE — DISCHARGE PLACEMENT REQUEST
"Lindsay Street (89 y.o. Female)     Date of Birth Social Security Number Address Home Phone MRN    1930  [facility] [place] [id number] [phone] [id number]    Adventism Marital Status          Congregational        Admission Date Admission Type Admitting Provider Attending Provider Department, Room/Bed    10/8/19 Emergency Henry Stapleton III, MD [place], Charles Brook III, MD [facility] CRISIS MGMT, /    Discharge Date Discharge Disposition Discharge Destination                       Attending Provider:  Henry Stapleton III, MD    Allergies:  Latex    Isolation:  None   Infection:  None   Code Status:  CPR    Ht:  152.4 cm (60\")   Wt:  64.1 kg (141 lb 5 oz)    Admission Cmt:  None   Principal Problem:  None                Active Insurance as of 10/8/2019     Primary Coverage     Payor Plan Insurance Group Employer/Plan Group    MEDICARE RAILROAD MEDICARE      Payor Plan Address Payor Plan Phone Number Payor Plan Fax Number Effective Dates    [address] [phone]  1995 - None Entered    [facility] [id number]       Subscriber Name Subscriber Birth Date Member ID       [address] 1930 [id number]           Secondary Coverage     Payor Plan Insurance Group Employer/Plan Group    [facility] SUPP [id number]     Payor Plan Address Payor Plan Phone Number Payor Plan Fax Number Effective Dates    [address]   2016 - None Entered    [facility] [id number]       Subscriber Name Subscriber Birth Date Member ID       [address] 1930 [id number]                 Emergency Contacts      (Rel.) Home Phone Work Phone Mobile Phone    MORILLOLEANDRO ALFARO (Daughter) [phone] -- [phone]    ADITHYA STREET (Son) [phone] -- [phone]              "

## 2019-10-09 NOTE — OUTREACH NOTE
CHF Week 1 Survey      Responses   Facility patient discharged from?  [facility]   Does the patient have one of the following disease processes/diagnoses(primary or secondary)?  CHF   Is there a successful TCM telephone encounter documented?  No   CHF Week 1 attempt successful?  No   Revoke  Readmitted          Jasmin Jade RN

## 2019-10-09 NOTE — PLAN OF CARE
Problem: Patient Care Overview  Goal: Individualization and Mutuality  Outcome: Ongoing (interventions implemented as appropriate)    Goal: Interprofessional Rounds/Family Conf  Outcome: Ongoing (interventions implemented as appropriate)   10/09/19 1113   Interdisciplinary Rounds/Family Conf   Summary Treatment team met to discuss pt's plan of care. Pt will receive PT & OT while inpt. SW will explore outpt providers. Progress & svcs needed will be assessed ongoing.   Interdisciplinary Rounds/Family Conf   Participants art therapy;;nursing;psychiatrist;pharmacy;social work     Patient/Guardian Signature: __________________________________            Psychiatrist Signature: ______________________________________             Therapist Signature: ________________________________________         Nurse Signature: ___________________________________________          Staff Signature: ____________________________________________            Staff Signature: ____________________________________________          Staff Signature: ____________________________________________          Staff Signature:                                                                                                      Problem: Cognitive Impairment (Dementia Signs/Symptoms) (Adult)  Goal: Optimized Cognitive Function (Dementia Signs/Symptoms)  Outcome: Ongoing (interventions implemented as appropriate)   10/09/19 1113   Optimized Cognitive Function (Dementia Signs/Symptoms)   Optimized Cognitive Ability Action Step/Short Term Goal (STG) Established 10/08/19   Optimized Cognitive Ability Time Frame for Action Step (STG) 4 days   Optimized Cognitive Ability Action Step (STG) Outcome making progress toward outcome

## 2019-10-10 NOTE — PLAN OF CARE
Problem: Patient Care Overview  Goal: Plan of Care Review  Outcome: Ongoing (interventions implemented as appropriate)   10/09/19 2002   Plan of Care Review   Progress improving   OTHER   Outcome Summary Patient slept until about noon. Once up, she was pleasant and cooperative. No complaints. Patient was compliant with all medications. No aggression noted.   Coping/Psychosocial   Plan of Care Reviewed With patient   Coping/Psychosocial   Patient Agreement with Plan of Care unable to participate

## 2019-10-10 NOTE — PLAN OF CARE
Problem: Patient Care Overview  Goal: Plan of Care Review   10/10/19 0955   OTHER   Outcome Summary Pt is an 89 yo female who presents from home due to worsening confusion, hallucinations, and paranoia. Pt was also hospitalized here ~1 week ago for CHF and confusion. Upon exam, pt demonstrates generalized weakness, impaired balance, and decreased endurance limiting independence with functional mobility. Pt also confused, oriented only to person at time of PT exam. Pt states she was ambulating with a walker at home. Per chart, pt has been weaker than usual and unable to get OOB for the last two weeks. Pt is currently at increased risk of falls and would benefit from continued PT to address impairments, improve safety, and assist with return to PLOF.

## 2019-10-10 NOTE — PROGRESS NOTES
The patient is notably improved today.  She is pleasant cooperative with care and it is our understanding that family has now agreed to the patient's referral for physical rehabilitation.  From a psychiatric standpoint, she appears ready for discharge.

## 2019-10-10 NOTE — PLAN OF CARE
Problem: Patient Care Overview  Goal: Plan of Care Review  Outcome: Ongoing (interventions implemented as appropriate)   10/10/19 1625   Plan of Care Review   Progress no change   OTHER   Outcome Summary Patient confused and only oriented to self. Denied any pain but did voice she has a tendency to get headaches. Made no complaints rest of day. Spent majority of day in lounge. Incentive spiromter used and patient cooperative. Pleasent and calm througout day. Cooperative with medications. continuing to monitor.    Coping/Psychosocial   Plan of Care Reviewed With patient   Coping/Psychosocial   Patient Agreement with Plan of Care agrees     Goal: Individualization and Mutuality  Outcome: Ongoing (interventions implemented as appropriate)    Goal: Discharge Needs Assessment  Outcome: Ongoing (interventions implemented as appropriate)    Goal: Interprofessional Rounds/Family Conf  Outcome: Ongoing (interventions implemented as appropriate)      Problem: Overarching Goals (Adult)  Goal: Adheres to Safety Considerations for Self and Others  Outcome: Ongoing (interventions implemented as appropriate)   10/10/19 1625   Overarching Goals (Adult)   Adheres to Safety Considerations for Self and Others making progress toward outcome     Intervention: Develop and Maintain Individualized Safety Plan   10/10/19 1100   C-SSRS (Screen-Recent) Past Month   Q1 Wished to be Dead (Past Month) no   Q2 Suicidal Thoughts (Past Month) no   Q6 Suicide Behavior (Lifetime) no   Violence Risk   Feels Like Hurting Others no   Develop and Maintain Individualized Safety Plan   Safety Measures safety rounds completed;suicide assessment completed       Goal: Optimized Coping Skills in Response to Life Stressors  Outcome: Ongoing (interventions implemented as appropriate)   10/10/19 1625   Overarching Goals (Adult)   Optimized Coping Skills in Response to Life Stressors making progress toward outcome     Intervention: Promote Effective Coping  Strategies   10/10/19 1100   Coping/Psychosocial Interventions   Supportive Measures active listening utilized;relaxation techniques promoted;self-care encouraged;self-reflection promoted;self-responsibility promoted;verbalization of feelings encouraged       Goal: Develops/Participates in Therapeutic [place] to Support Successful Transition  Outcome: Ongoing (interventions implemented as appropriate)   10/10/19 1625   Overarching Goals (Adult)   Develops/Participates in Therapeutic [place] to Support Successful Transition making progress toward outcome     Intervention: Foster Therapeutic [place]   10/10/19 1100   Interventions   Trust Relationship/Rapport care explained;choices provided;emotional support provided;empathic listening provided;questions answered;questions encouraged;reassurance provided;thoughts/feelings acknowledged         Problem: Cognitive Impairment (Dementia Signs/Symptoms) (Adult)  Goal: Optimized Cognitive Function (Dementia Signs/Symptoms)  Outcome: Ongoing (interventions implemented as appropriate)   10/10/19 1625   Optimized Cognitive Function (Dementia Signs/Symptoms)   Optimized Cognitive Ability Time Frame for Action Step (STG) 3 days   Optimized Cognitive Ability Action Step (STG) Outcome making progress toward outcome       Problem: Skin Injury Risk (Adult)  Goal: Skin Health and Integrity  Outcome: Ongoing (interventions implemented as appropriate)   10/10/19 1625   Skin Injury Risk (Adult)   Skin Health and Integrity making progress toward outcome       Problem: Fall Risk (Adult)  Goal: Absence of Fall  Outcome: Ongoing (interventions implemented as appropriate)   10/10/19 1625   Fall Risk (Adult)   Absence of Fall making progress toward outcome

## 2019-10-10 NOTE — THERAPY EVALUATION
Patient Name: Lindsay Martinez  : 1930    MRN: [id number]                              Today's Date: 10/10/2019       Admit Date: 10/8/2019    Visit Dx: No diagnosis found.  Patient Active Problem List   Diagnosis   • HTN (hypertension)   • Hyperlipidemia   • Late onset Alzheimer's disease without behavioral disturbance (CMS/HCC)   • Chronic venous insufficiency   • Type 2 diabetes mellitus without complication, without long-term current use of insulin (CMS/HCC)   • Hyperparathyroidism (CMS/HCC)   • Acute congestive heart failure (CMS/HCC)   • Myocardial infarction (CMS/HCC)   • Acute on chronic combined systolic and diastolic CHF (congestive heart failure) (CMS/HCC)   • Dementia with behavioral disturbance (CMS/HCC)   • Chronic CHF (CMS/HCC)   • CAD (coronary artery disease)   • Presence of cardiac pacemaker   • Anemia     Past Medical History:   Diagnosis Date   • Altered mental state    • CAD (coronary artery disease)    • CHF (congestive heart failure) (CMS/HCC)    • Chronic venous insufficiency    • Encephalopathy acute    • Hyperlipidemia    • Hypertension    • Late onset Alzheimer's disease without behavioral disturbance (CMS/HCC)      Past Surgical History:   Procedure Laterality Date   • EYE SURGERY     • HERNIA REPAIR     • HYSTERECTOMY     • PACEMAKER IMPLANTATION     • REFRACTIVE SURGERY Right    • THYROID SURGERY       General Information     Row Name 10/10/19 0949          PT Evaluation Time/Intention    Document Type  evaluation  -EE     Mode of Treatment  individual therapy;physical therapy  -EE     Row Name 10/10/19 0949          General Information    Patient Profile Reviewed?  yes  -EE     Prior Level of Function  -- Per chart, pt was ambulatory at home 2 weeks ago, but has been unable to get OOB since then. Pt states she has rwx at home.  -EE     Existing Precautions/Restrictions  fall  -EE     Barriers to Rehab  cognitive status;previous functional deficit  -EE     Row Name 10/10/19 0996           Relationship/Environment    Lives With  -- pt states she lives alone; per chart, pt lives w/grandchildren  -EE     Row Name 10/10/19 0949          Resource/Environmental Concerns    Current Living Arrangements  home/apartment/condo  -EE     Row Name 10/10/19 0949          Cognitive Assessment/Intervention- PT/OT    Orientation Status (Cognition)  oriented to;person  -EE     Row Name 10/10/19 0949          Safety Issues, Functional Mobility    Safety Issues Affecting Function (Mobility)  awareness of need for assistance;insight into deficits/self awareness;safety precaution awareness  -EE     Impairments Affecting Function (Mobility)  balance;cognition;strength;endurance/activity tolerance  -EE       User Key  (r) = Recorded By, (t) = Taken By, (c) = Cosigned By    Initials Name Provider Type    Florina Abbasi, PT Physical Therapist        Mobility     Row Name 10/10/19 0951          Bed Mobility Assessment/Treatment    Bed Mobility Assessment/Treatment  supine-sit  -EE     Supine-Sit [place] (Bed Mobility)  minimum assist (75% patient effort);verbal cues  -EE     Assistive Device (Bed Mobility)  head of bed elevated  -EE     Row Name 10/10/19 0951          Transfer Assessment/Treatment    Comment (Transfers)  Posterior lean initially; able to correct with verbal cues  -EE     Row Name 10/10/19 0951          Sit-Stand Transfer    Sit-Stand [place] (Transfers)  minimum assist (75% patient effort);verbal cues  -EE     Assistive Device (Sit-Stand Transfers)  walker, front-wheeled  -EE     Row Name 10/10/19 0951          Gait/Stairs Assessment/Training    [place] Level (Gait)  minimum assist (75% patient effort);verbal cues  -EE     Assistive Device (Gait)  walker, front-wheeled  -EE     Distance in Feet (Gait)  75  -EE     Deviations/Abnormal Patterns (Gait)  [place] decreased;stride length decreased;base of support, narrow  -EE     Bilateral Gait Deviations  forward flexed posture;heel strike  decreased  -EE     Comment (Gait/Stairs)  Min A required for walker management; cues to stay close to device for safety.   -EE       User Key  (r) = Recorded By, (t) = Taken By, (c) = Cosigned By    Initials Name Provider Type    EE Florina Sanchez, PT Physical Therapist        Obj/Interventions     Row Name 10/10/19 0952          General ROM    GENERAL ROM COMMENTS  B LE AROM WFL  -EE     Row Name 10/10/19 0952          MMT (Manual Muscle Testing)    General MMT Comments  B LEs grossly 3+/5  -EE     Row Name 10/10/19 0952          Static Sitting Balance    Level of [place] (Unsupported Sitting, Static Balance)  contact guard assist  -EE     Sitting Position (Unsupported Sitting, Static Balance)  sitting on edge of bed  -EE     Time Able to Maintain Position (Unsupported Sitting, Static Balance)  2 to 3 minutes  -EE     Row Name 10/10/19 0952          Static Standing Balance    Level of [place] (Supported Standing, Static Balance)  minimal assist, 75% patient effort;contact guard assist  -EE     Time Able to Maintain Position (Supported Standing, Static Balance)  1 to 2 minutes  -EE     Assistive Device Utilized (Supported Standing, Static Balance)  walker, rolling  -EE     Comment (Supported Standing, Static Balance)  Posterior lean initially, able to correct with cues and then stand with CGA and rwx  -EE       User Key  (r) = Recorded By, (t) = Taken By, (c) = Cosigned By    Initials Name Provider Type    EE Florina Sanchez, PT Physical Therapist        Goals/Plan     Row Name 10/10/19 0954          Bed Mobility Goal 1 (PT)    Activity/Assistive Device (Bed Mobility Goal 1, PT)  bed mobility activities, all  -EE     [place] Level/Cues Needed (Bed Mobility Goal 1, PT)  supervision required;conditional independence  -EE     Time Frame (Bed Mobility Goal 1, PT)  2 weeks  -EE     Row Name 10/10/19 0954          Transfer Goal 1 (PT)    Activity/Assistive Device (Transfer Goal 1, PT)  sit-to-stand/stand-to-sit   -EE     [place] Level/Cues Needed (Transfer Goal 1, PT)  standby assist  -EE     Time Frame (Transfer Goal 1, PT)  2 weeks  -EE     Row Name 10/10/19 0954          Gait Training Goal 1 (PT)    Activity/Assistive Device (Gait Training Goal 1, PT)  gait (walking locomotion);assistive device use  -EE     [place] Level (Gait Training Goal 1, PT)  standby assist  -EE     Distance (Gait Goal 1, PT)  200  -EE     Time Frame (Gait Training Goal 1, PT)  2 weeks  -EE       User Key  (r) = Recorded By, (t) = Taken By, (c) = Cosigned By    Initials Name Provider Type    EE Florina Sanchez, PT Physical Therapist        Clinical Impression     Row Name 10/10/19 0953          Pain Assessment    Additional Documentation  Pain Scale: Numbers Pre/Post-Treatment (Group)  -EE     Row Name 10/10/19 0953          Pain Scale: Numbers Pre/Post-Treatment    Pain Scale: Numbers, Pretreatment  0/10 - no pain  -EE     Pain Scale: Numbers, Post-Treatment  0/10 - no pain  -EE     Row Name 10/10/19 0953          Plan of Care Review    Plan of Care Reviewed With  patient  -EE     Row Name 10/10/19 0953          Physical Therapy Clinical Impression    Criteria for Skilled Interventions Met (PT Clinical Impression)  yes;treatment indicated  -EE     Rehab Potential (PT Clinical Summary)  good, to achieve stated therapy goals  -EE     Row Name 10/10/19 0953          Positioning and Restraints    Pre-Treatment Position  in bed  -EE     Post Treatment Position  chair  -EE     In Chair  reclined;encouraged to call for assist;notified nsg;patient within staff view Seated in common area; RN, Raymundo, notified.  -EE       User Key  (r) = Recorded By, (t) = Taken By, (c) = Cosigned By    Initials Name Provider Type    EE Florina Sanchez, PT Physical Therapist        Outcome Measures     Row Name 10/10/19 0943          How much help from another person do you currently need...    Turning from your back to your side while in flat bed without using bedrails?   3  -EE     Moving from lying on back to sitting on the side of a flat bed without bedrails?  3  -EE     Moving to and from a bed to a chair (including a wheelchair)?  3  -EE     Standing up from a chair using your arms (e.g., wheelchair, bedside chair)?  3  -EE     Climbing 3-5 steps with a railing?  2  -EE     To walk in hospital room?  3  -EE     AM-PAC 6 Clicks Score (PT)  17  -EE     Row Name 10/10/19 0957          Functional Assessment    Outcome Measure Options  AM-PAC 6 Clicks Basic Mobility (PT)  -EE       User Key  (r) = Recorded By, (t) = Taken By, (c) = Cosigned By    Initials Name Provider Type    EE Florina Sanchez PT Physical Therapist        Physical Therapy Education     Title: PT OT SLP Therapies (In Progress)     Topic: Physical Therapy (In Progress)     Point: Mobility training (Done)     Learning Progress Summary           Patient Acceptance, E, VU,NR by EE at 10/10/2019  9:55 AM                   Point: Body mechanics (Done)     Learning Progress Summary           Patient Acceptance, E, VU,NR by EE at 10/10/2019  9:55 AM                               User Key     Initials Effective Dates Name Provider Type Discipline    EE 04/03/18 -  Florina Sanchez, [employer] Physical Therapist PT              PT Recommendation and Plan  Planned Therapy Interventions (PT Eval): balance training, bed mobility training, gait training, home exercise program, patient/family education, ROM (range of motion), strengthening, stretching, transfer training  Outcome Summary/Treatment Plan (PT)  Anticipated Discharge Disposition (PT): skilled nursing facility, home with 24/7 care, home with home health  Plan of Care Reviewed With: patient  Outcome Summary: Pt is an 89 yo female who presents from home due to worsening confusion, hallucinations, and paranoia. Pt was also hospitalized here ~1 week ago for CHF and confusion. Upon exam, pt demonstrates generalized weakness, impaired balance, and decreased endurance limiting independence  with functional mobility. Pt also confused, oriented only to person at time of PT exam. Pt states she was ambulating with a walker at home. Per chart, pt has been weaker than usual and unable to get OOB for the last two weeks. Pt is currently at increased risk of falls and would benefit from continued PT to address impairments, improve safety, and assist with return to PLOF.      Time Calculation:   PT Charges     Row Name 10/10/19 0957             Time Calculation    Start Time  0933  -EE      Stop Time  0946  -EE      Time Calculation (min)  13 min  -EE      PT Received On  10/10/19  -EE      PT - Next Appointment  10/11/19  -EE      PT Goal Re-Cert Due Date  10/24/19  -EE        User Key  (r) = Recorded By, (t) = Taken By, (c) = Cosigned By    Initials Name Provider Type    Florina Abbasi, PT Physical Therapist        Therapy Charges for Today     Code Description Service Date Service Provider Modifiers Qty    [id number] HC PT EVAL MOD COMPLEXITY 2 10/10/2019 Florina Sanchez, PT GP 1          PT G-Codes  Outcome Measure Options: AM-PAC 6 Clicks Basic Mobility (PT)  AM-PAC 6 Clicks Score (PT): 17    Florina Sanchez PT  10/10/2019

## 2019-10-10 NOTE — PLAN OF CARE
Problem: Patient Care Overview  Goal: Plan of Care Review  Outcome: Ongoing (interventions implemented as appropriate)   10/10/19 0200 10/10/19 0525   Plan of Care Review   Progress --  no change   OTHER   Outcome Summary --  Pt has been pleasantly confused and oriented to self only. Pt was cooperative with care and compliant with all meds. Steri-strips removed and surgical site WNL. No agitation or aggression seen this shift. 10/10/19 0527     Coping/Psychosocial   Plan of Care Reviewed With patient --    Coping/Psychosocial   Patient Agreement with Plan of Care agrees --        Problem: Overarching Goals (Adult)  Goal: Adheres to Safety Considerations for Self and Others  Outcome: Ongoing (interventions implemented as appropriate)   10/10/19 0525   Overarching Goals (Adult)   Adheres to Safety Considerations for Self and Others making progress toward outcome     Goal: Optimized Coping Skills in Response to Life Stressors  Outcome: Ongoing (interventions implemented as appropriate)   10/10/19 0525   Overarching Goals (Adult)   Optimized Coping Skills in Response to Life Stressors making progress toward outcome     Goal: Develops/Participates in Therapeutic [place] to Support Successful Transition  Outcome: Ongoing (interventions implemented as appropriate)   10/10/19 0525   Overarching Goals (Adult)   Develops/Participates in Therapeutic [place] to Support Successful Transition making progress toward outcome

## 2019-10-11 NOTE — PLAN OF CARE
Problem: Patient Care Overview  Goal: Plan of Care Review  Outcome: Ongoing (interventions implemented as appropriate)   10/10/19 2318 10/11/19 0426   Plan of Care Review   Progress --  improving   OTHER   Outcome Summary --  Pt is pleasantly confused this shift and oriented to self only. Pt compliant with medications and using her incentive spirometer. Will continue to monitor mood and behavior and provide a safe environment.   Coping/Psychosocial   Plan of Care Reviewed With patient --    Coping/Psychosocial   Patient Agreement with Plan of Care agrees --       10/10/19 2318 10/11/19 0426   Plan of Care Review   Progress --  improving   OTHER   Outcome Summary --  Pt is pleasantly confused this shift and oriented to self only. Pt compliant with medications and using her incentive spirometer. Will continue to monitor mood and behavior and provide a safe environment.   Coping/Psychosocial   Plan of Care Reviewed With patient --    Coping/Psychosocial   Patient Agreement with Plan of Care agrees --        Problem: Overarching Goals (Adult)  Goal: Adheres to Safety Considerations for Self and Others  Outcome: Ongoing (interventions implemented as appropriate)    Goal: Optimized Coping Skills in Response to Life Stressors  Outcome: Ongoing (interventions implemented as appropriate)    Goal: Develops/Participates in Therapeutic [place] to Support Successful Transition  Outcome: Ongoing (interventions implemented as appropriate)      Problem: Cognitive Impairment (Dementia Signs/Symptoms) (Adult)  Goal: Optimized Cognitive Function (Dementia Signs/Symptoms)  Outcome: Ongoing (interventions implemented as appropriate)      Problem: Skin Injury Risk (Adult)  Goal: Skin Health and Integrity  Outcome: Ongoing (interventions implemented as appropriate)      Problem: Fall Risk (Adult)  Goal: Absence of Fall  Outcome: Ongoing (interventions implemented as appropriate)

## 2019-10-11 NOTE — PROGRESS NOTES
Case Management Discharge Note    Final Note: Spoke with pt's son Kervin Martinez [phone]  this moring to discuss DCP.  Pt's son reports that they were visiting pt last evening and feel she is back to her baseline and would like to take pt home instead of pt going to a [facility].  Spoke with [facility] staff and MD who agree pt can be DC to home.  Pt was current with [facility]. [facility] placed to [facility] to inform her of pt's DC today. Also pt's son would like pt to be set up with a in home visiting PCP due to pt having trouble getting out to MD appt.  Referral called and faxed to [facility] [phone] (intake) [phone] (fax).   Pt's cain Kennedy [phone] will provide transportation home for pt today.      Destination      No service has been selected for the patient.      Durable Medical Equipment      No service has been selected for the patient.      Dialysis/Infusion      No service has been selected for the patient.      Home Medical Care - Selection Complete      Service Provider Request Status Selected Services Address Phone Number Fax Number    [facility] Selected Home Health Services [address] [place], [place] [id number] [phone]      Therapy      No service has been selected for the patient.      Community Resources      No service has been selected for the patient.             Final Discharge Disposition Code: 06 - home with home health care

## 2019-10-11 NOTE — DISCHARGE SUMMARY
DATES OF ADMISSION: 10/8/2019-10/11/2019    REASON FOR ADMISSION: Patient is an 89-year-old white female admitted with increasing confusion following a brief stay in the hospital last week.    LABS: See chart    HOSPITAL COURSE: The patient was admitted to the crisis management unit and placed on [place] 2 programming.  When seen initially by this physician the patient was quite confused and appeared most delirious.  The symptoms resolved rapidly, however.  By 1010 the patient was in brighter spirits.  She was pleasantly confused but fully compliant with medications and no management problem whatsoever.  As the patient has shown positive response to low-dose Abilify while hospitalized last week, this medication was reinitiated and the patient was continued on previously prescribed Aricept and Remeron.  Initially we had hoped to send the patient for physical rehabilitation however family wished to take the patient home and this was so ordered on 10/11/2019.    FINAL DIAGNOSIS: Delirium resolved, dementia unspecified, history of congestive heart failure, hypertension, iron deficiency anemia, dyslipidemia    DISPOSITION ON DISCHARGE: A full listing of the patient's discharge medications is provided below.  Follow-up will take place with community mental health resources and home health will be requested.    PROGNOSIS: Fair       Discharge Medications      New Medications      Instructions Start Date   ARIPiprazole 2 MG tablet  Commonly known as:  ABILIFY   2 mg, Oral, Daily   Start Date:  10/12/2019        Changes to Medications      Instructions Start Date   donepezil 5 MG tablet  Commonly known as:  ARICEPT  What changed:  when to take this   5 mg, Oral, Nightly      ferrous sulfate 325 (65 FE) MG tablet  Commonly known as:  IRON SUPPLEMENT  What changed:  additional instructions   325 mg, Oral, Daily With Breakfast         Continue These Medications      Instructions Start Date   aspirin 81 MG EC tablet   81 mg, Oral,  Daily      furosemide 40 MG tablet  Commonly known as:  LASIX   40 mg, Oral, Daily      magnesium oxide 400 (241.3 Mg) MG tablet tablet  Commonly known as:  MAGOX   400 mg, Oral, 2 Times Daily      melatonin 5 MG tablet tablet   10 mg, Oral, Nightly PRN      metoprolol succinate XL 25 MG 24 hr tablet  Commonly known as:  TOPROL-XL   12.5 mg, Oral, Daily      mirtazapine 7.5 MG tablet  Commonly known as:  REMERON   7.5 mg, Oral, Nightly      potassium chloride 20 MEQ CR tablet  Commonly known as:  K-DUR,KLOR-CON   20 mEq, Oral, Daily      simvastatin 20 MG tablet  Commonly known as:  ZOCOR   20 mg, Oral, Nightly

## 2019-12-09 ENCOUNTER — TELEPHONE (OUTPATIENT)
Dept: FAMILY MEDICINE CLINIC | Facility: CLINIC | Age: 84
End: 2019-12-09

## 2019-12-09 NOTE — TELEPHONE ENCOUNTER
Pandas hieu wanted you to know that she passed away &  they are so thankful for all the care you provided.
